# Patient Record
Sex: FEMALE | Race: WHITE | NOT HISPANIC OR LATINO | Employment: OTHER | URBAN - METROPOLITAN AREA
[De-identification: names, ages, dates, MRNs, and addresses within clinical notes are randomized per-mention and may not be internally consistent; named-entity substitution may affect disease eponyms.]

---

## 2018-03-23 PROBLEM — M25.532 WRIST PAIN, LEFT: Status: ACTIVE | Noted: 2018-03-23

## 2021-04-30 PROBLEM — M47.816 LUMBAR SPONDYLOSIS: Status: ACTIVE | Noted: 2021-04-30

## 2023-02-08 PROBLEM — I11.0 HYPERTENSIVE HEART DISEASE WITH HEART FAILURE: Status: ACTIVE | Noted: 2023-02-08

## 2024-01-11 ENCOUNTER — OFFICE VISIT (OUTPATIENT)
Dept: INTERNAL MEDICINE | Facility: CLINIC | Age: 89
End: 2024-01-11
Payer: MEDICARE

## 2024-01-11 VITALS
HEIGHT: 66 IN | SYSTOLIC BLOOD PRESSURE: 100 MMHG | DIASTOLIC BLOOD PRESSURE: 60 MMHG | RESPIRATION RATE: 18 BRPM | BODY MASS INDEX: 24.1 KG/M2 | WEIGHT: 149.94 LBS | HEART RATE: 81 BPM | OXYGEN SATURATION: 95 %

## 2024-01-11 DIAGNOSIS — I11.0 HYPERTENSIVE HEART DISEASE WITH HEART FAILURE: ICD-10-CM

## 2024-01-11 DIAGNOSIS — F33.1 MAJOR DEPRESSIVE DISORDER, RECURRENT EPISODE, MODERATE: ICD-10-CM

## 2024-01-11 DIAGNOSIS — J44.9 CHRONIC OBSTRUCTIVE PULMONARY DISEASE, UNSPECIFIED COPD TYPE: ICD-10-CM

## 2024-01-11 DIAGNOSIS — F02.83 DEMENTIA IN OTHER DISEASES CLASSIFIED ELSEWHERE, UNSPECIFIED SEVERITY, WITH MOOD DISTURBANCE: Primary | ICD-10-CM

## 2024-01-11 DIAGNOSIS — I48.20 CHRONIC ATRIAL FIBRILLATION: ICD-10-CM

## 2024-01-11 DIAGNOSIS — N18.31 CHRONIC KIDNEY DISEASE, STAGE 3A: ICD-10-CM

## 2024-01-11 DIAGNOSIS — I70.0 AORTIC ATHEROSCLEROSIS: ICD-10-CM

## 2024-01-11 DIAGNOSIS — H35.3231 EXUDATIVE AGE-RELATED MACULAR DEGENERATION, BILATERAL, WITH ACTIVE CHOROIDAL NEOVASCULARIZATION: ICD-10-CM

## 2024-01-11 PROCEDURE — 99999 PR PBB SHADOW E&M-EST. PATIENT-LVL IV: CPT | Mod: PBBFAC,,, | Performed by: INTERNAL MEDICINE

## 2024-01-11 PROCEDURE — 99214 OFFICE O/P EST MOD 30 MIN: CPT | Mod: S$GLB,,, | Performed by: INTERNAL MEDICINE

## 2024-01-11 NOTE — PROGRESS NOTES
"HPI:  Caroline Arguello is a 89 y.o. female here for Follow-up  SHE WAS AT [facility] ; REHAB ; MULTIPLE FALLS. NOW BACK AT HOME   Brought by daughter ; she has multiple episodes of syncope with defection and urination.  She is taking gabapentin for back pain   Dr Israel RANGEL  has been monitoring BP via phone.    Review of Systems   Constitutional:  Negative for chills, fatigue, fever and unexpected weight change.   HENT:  Negative for congestion, ear pain, sore throat and trouble swallowing.    Eyes:  Negative for pain and visual disturbance.   Respiratory:  Negative for cough, chest tightness and shortness of breath.    Cardiovascular:  Negative for chest pain, palpitations and leg swelling.   Gastrointestinal:  Negative for abdominal distention, abdominal pain, constipation, diarrhea and vomiting.   Genitourinary:  Negative for difficulty urinating, dysuria, flank pain, frequency and hematuria.   Musculoskeletal:  Positive for back pain. Negative for gait problem, joint swelling, neck pain and neck stiffness.   Skin:  Negative for rash and wound.   Neurological:  Negative for dizziness, seizures, speech difficulty, light-headedness and headaches.    A review of systems was performed and is negative except as noted above.    Objective:  Vitals:    01/11/24 1551   BP: 100/60   Pulse: 81   Resp: 18   SpO2: 95%   Weight: 68 kg (149 lb 14.6 oz)   Height: 5' 6" (1.676 m)      Physical Exam  Vitals and nursing note reviewed.   Constitutional:       Appearance: She is well-developed.   HENT:      Head: Normocephalic and atraumatic.      Right Ear: External ear normal.      Left Ear: External ear normal.   Eyes:      Conjunctiva/sclera: Conjunctivae normal.      Pupils: Pupils are equal, round, and reactive to light.   Neck:      Thyroid: No thyromegaly.      Vascular: No JVD.      Trachea: No tracheal deviation.   Cardiovascular:      Rate and Rhythm: Normal rate and regular rhythm.      Heart sounds: Normal heart sounds. "   Pulmonary:      Effort: Pulmonary effort is normal. No respiratory distress.      Breath sounds: Normal breath sounds. No wheezing or rales.   Chest:      Chest wall: No tenderness.   Abdominal:      General: Bowel sounds are normal. There is no distension.      Palpations: Abdomen is soft. There is no mass.      Tenderness: There is no abdominal tenderness. There is no guarding or rebound.   Musculoskeletal:         General: Normal range of motion.      Cervical back: Normal range of motion and neck supple.   Lymphadenopathy:      Cervical: No cervical adenopathy.   Skin:     General: Skin is warm and dry.   Neurological:      Mental Status: She is alert and oriented to person, place, and time.      Cranial Nerves: No cranial nerve deficit.      Motor: No abnormal muscle tone.      Coordination: Coordination normal.      Deep Tendon Reflexes: Reflexes are normal and symmetric. Reflexes normal.      Comments: CN: Optic discs are flat with normal vasculature, PERRL, Extraoccular movements and visual fields are full. Normal facial sensation and strength, Hearing symmetric, Tongue and Palate are midline and strong. Shoulder Shrug symmetric and strong.        Assessment/Plan:  1. Dementia in other diseases classified elsewhere, unspecified severity, with mood disturbance  Loss of memory   No medications    2. Chronic obstructive pulmonary disease, unspecified COPD type  Nebs as needed     3. Major depressive disorder, recurrent episode, moderate  Continue with zoloft     4. Hypertensive heart disease with heart failure  Continue coreg     5. Chronic atrial fibrillation  Continue xarelto/coreg    6. Exudative age-related macular degeneration, bilateral, with active choroidal neovascularization  Slowly getting worse     7. Chronic kidney disease, stage 3a  BMP  Lab Results   Component Value Date     10/27/2023    K 3.6 10/27/2023     10/27/2023    CO2 24 10/27/2023    BUN 9 10/27/2023    CREATININE 1.0  10/27/2023    CALCIUM 9.8 10/27/2023    ANIONGAP 12 10/27/2023    EGFRNORACEVR 54 (A) 10/27/2023       8. Aortic atherosclerosis  Overview:  Noted on imaging 6/6/2011 CXR  Continue atorvastatin

## 2024-04-24 ENCOUNTER — HOSPITAL ENCOUNTER (EMERGENCY)
Facility: HOSPITAL | Age: 89
Discharge: ANOTHER HOSPITAL | End: 2024-04-24
Attending: SURGERY
Payer: MEDICARE

## 2024-04-24 VITALS
OXYGEN SATURATION: 98 % | WEIGHT: 149.94 LBS | DIASTOLIC BLOOD PRESSURE: 65 MMHG | BODY MASS INDEX: 24.2 KG/M2 | SYSTOLIC BLOOD PRESSURE: 150 MMHG | HEART RATE: 101 BPM | RESPIRATION RATE: 28 BRPM | TEMPERATURE: 98 F

## 2024-04-24 DIAGNOSIS — S72.002A CLOSED FRACTURE OF NECK OF LEFT FEMUR, INITIAL ENCOUNTER: Primary | ICD-10-CM
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
## 2024-04-24 LAB
BASOPHILS # BLD AUTO: 0.02 K/UL (ref 0–0.2)
BASOPHILS NFR BLD: 0.2 % (ref 0–1.9)
DIFFERENTIAL METHOD BLD: ABNORMAL
EOSINOPHIL # BLD AUTO: 0 K/UL (ref 0–0.5)
EOSINOPHIL NFR BLD: 0 % (ref 0–8)
ERYTHROCYTE [DISTWIDTH] IN BLOOD BY AUTOMATED COUNT: 14.4 % (ref 11.5–14.5)
HCT VFR BLD AUTO: 39.4 % (ref 37–48.5)
HGB BLD-MCNC: 13.1 G/DL (ref 12–16)
IMM GRANULOCYTES # BLD AUTO: 0.03 K/UL (ref 0–0.04)
IMM GRANULOCYTES NFR BLD AUTO: 0.3 % (ref 0–0.5)
LYMPHOCYTES # BLD AUTO: 0.9 K/UL (ref 1–4.8)
LYMPHOCYTES NFR BLD: 8.3 % (ref 18–48)
MCH RBC QN AUTO: 30.3 PG (ref 27–31)
MCHC RBC AUTO-ENTMCNC: 33.2 G/DL (ref 32–36)
MCV RBC AUTO: 91 FL (ref 82–98)
MONOCYTES # BLD AUTO: 0.4 K/UL (ref 0.3–1)
MONOCYTES NFR BLD: 4.3 % (ref 4–15)
NEUTROPHILS # BLD AUTO: 9 K/UL (ref 1.8–7.7)
NEUTROPHILS NFR BLD: 86.9 % (ref 38–73)
NRBC BLD-RTO: 0 /100 WBC
PLATELET # BLD AUTO: 224 K/UL (ref 150–450)
PMV BLD AUTO: 9.2 FL (ref 9.2–12.9)
RBC # BLD AUTO: 4.32 M/UL (ref 4–5.4)
WBC # BLD AUTO: 10.3 K/UL (ref 3.9–12.7)

## 2024-04-24 PROCEDURE — 96375 TX/PRO/DX INJ NEW DRUG ADDON: CPT

## 2024-04-24 PROCEDURE — 80053 COMPREHEN METABOLIC PANEL: CPT | Performed by: SURGERY

## 2024-04-24 PROCEDURE — 96374 THER/PROPH/DIAG INJ IV PUSH: CPT

## 2024-04-24 PROCEDURE — 99285 EMERGENCY DEPT VISIT HI MDM: CPT | Mod: 25

## 2024-04-24 PROCEDURE — 63600175 PHARM REV CODE 636 W HCPCS: Mod: JZ,TB | Performed by: SURGERY

## 2024-04-24 PROCEDURE — 25000003 PHARM REV CODE 250: Performed by: SURGERY

## 2024-04-24 PROCEDURE — 85025 COMPLETE CBC W/AUTO DIFF WBC: CPT | Performed by: SURGERY

## 2024-04-24 RX ORDER — ONDANSETRON HYDROCHLORIDE 2 MG/ML
4 INJECTION, SOLUTION INTRAVENOUS
Status: COMPLETED | OUTPATIENT
Start: 2024-04-24 | End: 2024-04-24

## 2024-04-24 RX ORDER — MORPHINE SULFATE 2 MG/ML
1 INJECTION, SOLUTION INTRAMUSCULAR; INTRAVENOUS
Status: COMPLETED | OUTPATIENT
Start: 2024-04-24 | End: 2024-04-24

## 2024-04-24 RX ADMIN — MORPHINE SULFATE 1 MG: 2 INJECTION, SOLUTION INTRAMUSCULAR; INTRAVENOUS at 05:04

## 2024-04-24 RX ADMIN — SODIUM CHLORIDE 1000 ML: 9 INJECTION, SOLUTION INTRAVENOUS at 05:04

## 2024-04-24 RX ADMIN — ONDANSETRON 4 MG: 2 INJECTION INTRAMUSCULAR; INTRAVENOUS at 05:04

## 2024-04-24 NOTE — ED PROVIDER NOTES
Encounter Date: 4/24/2024       History     Chief Complaint   Patient presents with    Fall     PT TO ER VIA [facility] FOLLOWING AN UNWITNESSED GROUND LEVEL FALL. PT HAS A CHRONIC GCS OF 13 DUE TO DEMENTIA. PT REPORTS LEFT LEG AND LEFT HIP PAIN. DENIES BLOOD THINNERS. PT DOESN'T KNOW IF SHE HIT HER HEAD OF LOC.      History of Present Illness  Caroline Arguello is a 89 y.o. female that presents with left hip pain today  Patient with a slip & fall was not witnessed earlier this morning, no LOC  Patient has significant dementia, patient has had several falls in the past  Patient on exam has obvious tenderness to left hip area, no deformity now  Good distal pulses, good capillary refill, neurovascular intact on evaluation    The history is provided by the patient and the EMS personnel.     Review of patient's allergies indicates:   Allergen Reactions    Tramadol Other (See Comments)     Dizziness, disoriented     Past Medical History:   Diagnosis Date    Anticoagulant long-term use     Anxiety     Arthritis     Atrial fibrillation     2011- on xarelto    Back pain     S/p discectomy     Depression     Endometriosis, site unspecified     Hyperlipidemia     Hypertension     Hypertensive heart disease with heart failure 2/8/2023    Insomnia     Osteoporosis     Osteoporosis, unspecified     Urinary tract infection, site not specified      Past Surgical History:   Procedure Laterality Date    ADENOIDECTOMY      BACK SURGERY      1980 discectomy and 1989 scar tissue    BREAST BIOPSY Bilateral     negative    CAUDAL EPIDURAL STEROID INJECTION N/A 8/19/2022    Procedure: CAUDAL EPIDURAL STEROID INJECTION WITH CATHETER;  Surgeon: Radhika Thurman MD;  Location: [facility] OR;  Service: Pain Management;  Laterality: N/A;    CHOLECYSTECTOMY      COLONOSCOPY  2005    COLONOSCOPY N/A 9/28/2017    Procedure: COLONOSCOPY;  Surgeon: Jeffrey Baptiste MD;  Location: [facility];  Service: Endoscopy;  Laterality: N/A;    EYE SURGERY      cataract  bilaterally    HYSTERECTOMY  over 30 yr ago    DUB; total    INJECTION OF ANESTHETIC AGENT AROUND MEDIAL BRANCH NERVES INNERVATING LUMBAR FACET JOINT Bilateral 4/30/2021    Procedure: LUMBAR FACET JOINT BLOCK (L3-4,L4-5,L5-S1);  Surgeon: Radhika Thurman MD;  Location: [facility] OR;  Service: Pain Management;  Laterality: Bilateral;    LEFT HEART CATHETERIZATION Left 10/5/2021    Procedure: Left heart cath;  Surgeon: Damian Wood MD;  Location: [facility] CATH;  Service: Cardiovascular;  Laterality: Left;    OOPHORECTOMY      SPINE SURGERY      TONSILLECTOMY      TRANSFORAMINAL EPIDURAL INJECTION OF STEROID Bilateral 1/8/2021    Procedure: TRANSFORAMINAL EPIDURAL STEROID INJECTION (L5);  Surgeon: Radhika Thurman MD;  Location: [facility] OR;  Service: Pain Management;  Laterality: Bilateral;    TRANSFORAMINAL EPIDURAL INJECTION OF STEROID Bilateral 3/12/2021    Procedure: TRANSFORAMINAL EPIDURAL STEROID INJECTION (L4);  Surgeon: Radhika Thurman MD;  Location: [facility] OR;  Service: Pain Management;  Laterality: Bilateral;     Family History   Problem Relation Name Age of Onset    Coronary artery disease Mother Kelli     Heart disease Mother Kelli         pacemaker    Breast cancer Cousin M &P first     Depression Father Christiano     Heart disease Father Christiano         PAD    Hyperlipidemia Sister 3     Breast cancer Maternal Aunt      Breast cancer Paternal Aunt      Colon cancer Neg Hx      Ovarian cancer Neg Hx       Social History     Tobacco Use    Smoking status: Never    Smokeless tobacco: Never   Substance Use Topics    Alcohol use: Yes     Alcohol/week: 0.8 standard drinks of alcohol     Types: 1 Standard drinks or equivalent per week     Comment: glass of wine ONCE A WEEK    Drug use: No     Review of Systems   Constitutional: Negative.    HENT: Negative.     Eyes: Negative.    Respiratory: Negative.     Cardiovascular: Negative.    Gastrointestinal: Negative.    Genitourinary: Negative.    Musculoskeletal:         (+) left  hip pain   Skin: Negative.    Neurological: Negative.    Psychiatric/Behavioral: Negative.         Physical Exam     Initial Vitals [04/24/24 0234]   BP Pulse Resp Temp SpO2   (!) 160/85 110 20 97.9 °F (36.6 °C) (!) 91 %      MAP       --         Physical Exam    Nursing note and vitals reviewed.  Constitutional: Vital signs are normal. She appears well-developed and well-nourished. She is cooperative.   HENT:   Head: Normocephalic and atraumatic.   Eyes: Conjunctivae, EOM and lids are normal. Pupils are equal, round, and reactive to light.   Neck: Trachea normal and phonation normal. Neck supple. No JVD present.   Normal range of motion.   Full passive range of motion without pain.     Cardiovascular:  Normal rate, regular rhythm, S1 normal, S2 normal, normal heart sounds, intact distal pulses and normal pulses.           Pulmonary/Chest: Effort normal and breath sounds normal.   Abdominal: Abdomen is soft and flat. Bowel sounds are normal.   Musculoskeletal:      Cervical back: Full passive range of motion without pain, normal range of motion and neck supple.      Comments: (+) tenderness of the left hip without any gross deformity     Neurological: She is alert and oriented to person, place, and time. She has normal strength.   Skin: Skin is warm, dry and intact. Capillary refill takes less than 2 seconds.         ED Course   Procedures  Labs Reviewed   COMPREHENSIVE METABOLIC PANEL - Abnormal; Notable for the following components:       Result Value    Potassium 3.2 (*)     Glucose 153 (*)     All other components within normal limits   CBC W/ AUTO DIFFERENTIAL - Abnormal; Notable for the following components:    Gran # (ANC) 9.0 (*)     Lymph # 0.9 (*)     Gran % 86.9 (*)     Lymph % 8.3 (*)     All other components within normal limits          Imaging Results              CT Pelvis Without Contrast (In process)                      X-Ray Tibia Fibula 2 View Left (In process)                      X-Ray Femur  Ap/Lat Left (In process)                      X-Ray Chest 1 View (In process)                      CT Head Without Contrast (In process)                      Medications   sodium chloride 0.9% bolus 1,000 mL 1,000 mL (1,000 mLs Intravenous New Bag 4/24/24 5116)   morphine injection 1 mg (1 mg Intravenous Given 4/24/24 3340)   ondansetron injection 4 mg (4 mg Intravenous Given 4/24/24 4834)     Medical Decision Making  Unwitnessed fall with residual left hip pain afterwards on ER interview  Differential: Hip  sprain, strain, fracture, dislocation, ligament/tendon injury    Problems Addressed:  Closed fracture of neck of left femur, initial encounter: complicated acute illness or injury  Fall: complicated acute illness or injury  Fall (on) (from) other stairs and steps, initial encounter: complicated acute illness or injury    Amount and/or Complexity of Data Reviewed  Labs: ordered. Decision-making details documented in ED Course.  Radiology: ordered and independent interpretation performed.    ED Management & Risks of Complication, Morbidity, & Mortality:  (-) CT head, (-) chest x-ray in the emergency room today  CT pelvis shows displaced left femoral neck fracture this morning  Discussed with [facility] who agrees to accept today    Critical Care ED Physician Time (minutes):  -- Performed by: Stephen Larsen M.D.  -- Date/Time: 7:31 AM 4/24/2024   -- Direct Patient Care (Face Time): 15  -- Additional History from Records or Taking Additional History: 15  -- Ordering, Reviewing, and Interpreting Diagnostic Studies: 15  -- Total Time in Documentation: 15  -- Consultation with Other Physicians: 15  -- Consultation with Family Related to Condition: 15  -- Total Critical Care Time: 75  -- Critical care was necessary to treat Femoral neck fracture/multi-system trauma  -- Critical care was time spent personally by me on the following activities:   -- development of treatment plan with patient & their family  --  examination of patient, ordering and performing treatments   -- review of radiographic studies, re-evaluation of pt's condition  -- review of labs and evaluation of response to treatment     Clinical Impression:  Final diagnoses:  [W19.XXXA] Fall  [W10.8XXA] Fall (on) (from) other stairs and steps, initial encounter  [S72.002A] Closed fracture of neck of left femur, initial encounter (Primary)          ED Disposition Condition    Transfer to Another Facility Stable                Stephen Larsen MD  04/24/24 0780

## 2024-04-25 PROBLEM — J96.01 ACUTE HYPOXIC RESPIRATORY FAILURE: Status: ACTIVE | Noted: 2024-04-25

## 2024-04-25 PROBLEM — J96.01 ACUTE HYPOXIC RESPIRATORY FAILURE: Status: RESOLVED | Noted: 2024-04-25 | Resolved: 2024-04-25

## 2024-04-25 PROBLEM — R06.89 ACUTE RESPIRATORY INSUFFICIENCY: Status: ACTIVE | Noted: 2024-04-25

## 2024-04-26 PROBLEM — J96.01 ACUTE HYPOXEMIC RESPIRATORY FAILURE: Status: ACTIVE | Noted: 2024-04-25
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
## 2024-05-03 ENCOUNTER — LAB VISIT (OUTPATIENT)
Dept: LAB | Facility: HOSPITAL | Age: 89
End: 2024-05-03
Attending: HOSPITALIST
Payer: MEDICARE

## 2024-05-03 DIAGNOSIS — R55 SYNCOPE AND COLLAPSE: Primary | ICD-10-CM

## 2024-05-03 LAB
ANISOCYTOSIS BLD QL SMEAR: SLIGHT
BASOPHILS # BLD AUTO: 0.06 K/UL (ref 0–0.2)
BASOPHILS NFR BLD: 0.9 % (ref 0–1.9)
DACRYOCYTES BLD QL SMEAR: ABNORMAL
DIFFERENTIAL METHOD BLD: ABNORMAL
EOSINOPHIL # BLD AUTO: 0.4 K/UL (ref 0–0.5)
EOSINOPHIL NFR BLD: 5.6 % (ref 0–8)
ERYTHROCYTE [DISTWIDTH] IN BLOOD BY AUTOMATED COUNT: 14.7 % (ref 11.5–14.5)
GIANT PLATELETS BLD QL SMEAR: PRESENT
HCT VFR BLD AUTO: 35.1 % (ref 37–48.5)
HGB BLD-MCNC: 11.6 G/DL (ref 12–16)
HYPOCHROMIA BLD QL SMEAR: ABNORMAL
IMM GRANULOCYTES # BLD AUTO: 0.03 K/UL (ref 0–0.04)
IMM GRANULOCYTES NFR BLD AUTO: 0.5 % (ref 0–0.5)
LYMPHOCYTES # BLD AUTO: 1.7 K/UL (ref 1–4.8)
LYMPHOCYTES NFR BLD: 25.5 % (ref 18–48)
MCH RBC QN AUTO: 30.2 PG (ref 27–31)
MCHC RBC AUTO-ENTMCNC: 33 G/DL (ref 32–36)
MCV RBC AUTO: 91 FL (ref 82–98)
MONOCYTES # BLD AUTO: 0.4 K/UL (ref 0.3–1)
MONOCYTES NFR BLD: 6.4 % (ref 4–15)
NEUTROPHILS # BLD AUTO: 4 K/UL (ref 1.8–7.7)
NEUTROPHILS NFR BLD: 61.1 % (ref 38–73)
NRBC BLD-RTO: 0 /100 WBC
OVALOCYTES BLD QL SMEAR: ABNORMAL
PLATELET # BLD AUTO: 357 K/UL (ref 150–450)
PLATELET BLD QL SMEAR: ABNORMAL
PMV BLD AUTO: 9.1 FL (ref 9.2–12.9)
POIKILOCYTOSIS BLD QL SMEAR: SLIGHT
POLYCHROMASIA BLD QL SMEAR: ABNORMAL
RBC # BLD AUTO: 3.84 M/UL (ref 4–5.4)
WBC # BLD AUTO: 6.55 K/UL (ref 3.9–12.7)

## 2024-05-03 PROCEDURE — 85025 COMPLETE CBC W/AUTO DIFF WBC: CPT | Performed by: HOSPITALIST

## 2024-05-03 PROCEDURE — 36415 COLL VENOUS BLD VENIPUNCTURE: CPT | Performed by: HOSPITALIST

## 2024-05-03 PROCEDURE — 80053 COMPREHEN METABOLIC PANEL: CPT | Performed by: HOSPITALIST

## 2024-05-24 ENCOUNTER — HOSPITAL ENCOUNTER (INPATIENT)
Facility: HOSPITAL | Age: 89
LOS: 2 days | Discharge: HOME | DRG: 312 | End: 2024-05-27
Attending: STUDENT IN AN ORGANIZED HEALTH CARE EDUCATION/TRAINING PROGRAM | Admitting: STUDENT IN AN ORGANIZED HEALTH CARE EDUCATION/TRAINING PROGRAM
Payer: MEDICARE

## 2024-05-24 DIAGNOSIS — R55 NEAR SYNCOPE: ICD-10-CM

## 2024-05-24 DIAGNOSIS — E86.0 DEHYDRATION: Primary | ICD-10-CM

## 2024-05-24 DIAGNOSIS — R53.1 GENERALIZED WEAKNESS: ICD-10-CM

## 2024-05-24 PROBLEM — N30.00 ACUTE CYSTITIS WITHOUT HEMATURIA: Status: ACTIVE | Noted: 2024-05-24

## 2024-05-24 LAB
ALBUMIN SERPL BCP-MCNC: 3.1 G/DL (ref 3.5–5.2)
ALBUMIN SERPL BCP-MCNC: 3.2 G/DL (ref 3.5–5.2)
ALBUMIN SERPL BCP-MCNC: 3.7 G/DL (ref 3.5–5.2)
ALP SERPL-CCNC: 76 U/L (ref 55–135)
ALP SERPL-CCNC: 80 U/L (ref 55–135)
ALP SERPL-CCNC: 83 U/L (ref 55–135)
ALT SERPL W/O P-5'-P-CCNC: 10 U/L (ref 10–44)
ALT SERPL W/O P-5'-P-CCNC: 11 U/L (ref 10–44)
ALT SERPL W/O P-5'-P-CCNC: 17 U/L (ref 10–44)
ANION GAP SERPL CALC-SCNC: 11 MMOL/L (ref 8–16)
ANION GAP SERPL CALC-SCNC: 11 MMOL/L (ref 8–16)
ANION GAP SERPL CALC-SCNC: 12 MMOL/L (ref 8–16)
AST SERPL-CCNC: 18 U/L (ref 10–40)
AST SERPL-CCNC: 22 U/L (ref 10–40)
AST SERPL-CCNC: 22 U/L (ref 10–40)
BACTERIA #/AREA URNS HPF: ABNORMAL /HPF
BASOPHILS # BLD AUTO: 0.08 K/UL (ref 0–0.2)
BASOPHILS NFR BLD: 0.7 % (ref 0–1.9)
BILIRUB SERPL-MCNC: 0.4 MG/DL (ref 0.1–1)
BILIRUB SERPL-MCNC: 0.9 MG/DL (ref 0.1–1)
BILIRUB SERPL-MCNC: 1 MG/DL (ref 0.1–1)
BILIRUB UR QL STRIP: ABNORMAL
BUN SERPL-MCNC: 10 MG/DL (ref 10–30)
BUN SERPL-MCNC: 13 MG/DL (ref 10–30)
BUN SERPL-MCNC: 9 MG/DL (ref 10–30)
CALCIUM SERPL-MCNC: 9 MG/DL (ref 8.7–10.5)
CALCIUM SERPL-MCNC: 9.2 MG/DL (ref 8.7–10.5)
CALCIUM SERPL-MCNC: 9.5 MG/DL (ref 8.7–10.5)
CHLORIDE SERPL-SCNC: 102 MMOL/L (ref 95–110)
CHLORIDE SERPL-SCNC: 104 MMOL/L (ref 95–110)
CHLORIDE SERPL-SCNC: 107 MMOL/L (ref 95–110)
CLARITY UR: ABNORMAL
CO2 SERPL-SCNC: 23 MMOL/L (ref 23–29)
CO2 SERPL-SCNC: 24 MMOL/L (ref 23–29)
CO2 SERPL-SCNC: 26 MMOL/L (ref 23–29)
COLOR UR: YELLOW
CREAT SERPL-MCNC: 0.7 MG/DL (ref 0.5–1.4)
CREAT SERPL-MCNC: 0.8 MG/DL (ref 0.5–1.4)
CREAT SERPL-MCNC: 1.1 MG/DL (ref 0.5–1.4)
DIFFERENTIAL METHOD BLD: ABNORMAL
EOSINOPHIL # BLD AUTO: 0.1 K/UL (ref 0–0.5)
EOSINOPHIL NFR BLD: 1.2 % (ref 0–8)
ERYTHROCYTE [DISTWIDTH] IN BLOOD BY AUTOMATED COUNT: 14.8 % (ref 11.5–14.5)
EST. GFR  (NO RACE VARIABLE): 47 ML/MIN/1.73 M^2
EST. GFR  (NO RACE VARIABLE): >60 ML/MIN/1.73 M^2
EST. GFR  (NO RACE VARIABLE): >60 ML/MIN/1.73 M^2
GLUCOSE SERPL-MCNC: 124 MG/DL (ref 70–110)
GLUCOSE SERPL-MCNC: 153 MG/DL (ref 70–110)
GLUCOSE SERPL-MCNC: 98 MG/DL (ref 70–110)
GLUCOSE UR QL STRIP: NEGATIVE
HCT VFR BLD AUTO: 37 % (ref 37–48.5)
HGB BLD-MCNC: 11.9 G/DL (ref 12–16)
HGB UR QL STRIP: ABNORMAL
HYALINE CASTS #/AREA URNS LPF: 0 /LPF
IMM GRANULOCYTES # BLD AUTO: 0.05 K/UL (ref 0–0.04)
IMM GRANULOCYTES NFR BLD AUTO: 0.4 % (ref 0–0.5)
KETONES UR QL STRIP: ABNORMAL
LACTATE SERPL-SCNC: 2 MMOL/L (ref 0.5–2.2)
LEUKOCYTE ESTERASE UR QL STRIP: ABNORMAL
LYMPHOCYTES # BLD AUTO: 2.2 K/UL (ref 1–4.8)
LYMPHOCYTES NFR BLD: 19.2 % (ref 18–48)
MCH RBC QN AUTO: 29.8 PG (ref 27–31)
MCHC RBC AUTO-ENTMCNC: 32.2 G/DL (ref 32–36)
MCV RBC AUTO: 93 FL (ref 82–98)
MICROSCOPIC COMMENT: ABNORMAL
MONOCYTES # BLD AUTO: 0.8 K/UL (ref 0.3–1)
MONOCYTES NFR BLD: 6.9 % (ref 4–15)
NEUTROPHILS # BLD AUTO: 8.1 K/UL (ref 1.8–7.7)
NEUTROPHILS NFR BLD: 71.6 % (ref 38–73)
NITRITE UR QL STRIP: NEGATIVE
NRBC BLD-RTO: 0 /100 WBC
OHS QRS DURATION: 70 MS
OHS QTC CALCULATION: 465 MS
PH UR STRIP: 8 [PH] (ref 5–8)
PLATELET # BLD AUTO: 277 K/UL (ref 150–450)
PMV BLD AUTO: 9.5 FL (ref 9.2–12.9)
POTASSIUM SERPL-SCNC: 3.2 MMOL/L (ref 3.5–5.1)
POTASSIUM SERPL-SCNC: 3.2 MMOL/L (ref 3.5–5.1)
POTASSIUM SERPL-SCNC: 4.4 MMOL/L (ref 3.5–5.1)
PROT SERPL-MCNC: 6.5 G/DL (ref 6–8.4)
PROT SERPL-MCNC: 6.6 G/DL (ref 6–8.4)
PROT SERPL-MCNC: 7.4 G/DL (ref 6–8.4)
PROT UR QL STRIP: ABNORMAL
RBC # BLD AUTO: 4 M/UL (ref 4–5.4)
RBC #/AREA URNS HPF: 4 /HPF (ref 0–4)
SODIUM SERPL-SCNC: 137 MMOL/L (ref 136–145)
SODIUM SERPL-SCNC: 141 MMOL/L (ref 136–145)
SODIUM SERPL-SCNC: 142 MMOL/L (ref 136–145)
SP GR UR STRIP: 1.01 (ref 1–1.03)
TROPONIN I SERPL DL<=0.01 NG/ML-MCNC: 0.01 NG/ML (ref 0–0.03)
TSH SERPL DL<=0.005 MIU/L-ACNC: 1.62 UIU/ML (ref 0.4–4)
URN SPEC COLLECT METH UR: ABNORMAL
UROBILINOGEN UR STRIP-ACNC: NEGATIVE EU/DL
WBC # BLD AUTO: 11.35 K/UL (ref 3.9–12.7)
WBC #/AREA URNS HPF: >100 /HPF (ref 0–5)

## 2024-05-24 PROCEDURE — 81000 URINALYSIS NONAUTO W/SCOPE: CPT | Performed by: STUDENT IN AN ORGANIZED HEALTH CARE EDUCATION/TRAINING PROGRAM

## 2024-05-24 PROCEDURE — 96365 THER/PROPH/DIAG IV INF INIT: CPT

## 2024-05-24 PROCEDURE — 96361 HYDRATE IV INFUSION ADD-ON: CPT

## 2024-05-24 PROCEDURE — 87086 URINE CULTURE/COLONY COUNT: CPT | Performed by: STUDENT IN AN ORGANIZED HEALTH CARE EDUCATION/TRAINING PROGRAM

## 2024-05-24 PROCEDURE — 87077 CULTURE AEROBIC IDENTIFY: CPT | Performed by: STUDENT IN AN ORGANIZED HEALTH CARE EDUCATION/TRAINING PROGRAM

## 2024-05-24 PROCEDURE — G0378 HOSPITAL OBSERVATION PER HR: HCPCS

## 2024-05-24 PROCEDURE — 83605 ASSAY OF LACTIC ACID: CPT | Performed by: STUDENT IN AN ORGANIZED HEALTH CARE EDUCATION/TRAINING PROGRAM

## 2024-05-24 PROCEDURE — 63600175 PHARM REV CODE 636 W HCPCS: Performed by: STUDENT IN AN ORGANIZED HEALTH CARE EDUCATION/TRAINING PROGRAM

## 2024-05-24 PROCEDURE — 84484 ASSAY OF TROPONIN QUANT: CPT | Performed by: STUDENT IN AN ORGANIZED HEALTH CARE EDUCATION/TRAINING PROGRAM

## 2024-05-24 PROCEDURE — 99285 EMERGENCY DEPT VISIT HI MDM: CPT | Mod: 25

## 2024-05-24 PROCEDURE — 84443 ASSAY THYROID STIM HORMONE: CPT | Performed by: STUDENT IN AN ORGANIZED HEALTH CARE EDUCATION/TRAINING PROGRAM

## 2024-05-24 PROCEDURE — 99222 1ST HOSP IP/OBS MODERATE 55: CPT | Mod: ,,, | Performed by: STUDENT IN AN ORGANIZED HEALTH CARE EDUCATION/TRAINING PROGRAM

## 2024-05-24 PROCEDURE — 25000003 PHARM REV CODE 250: Performed by: STUDENT IN AN ORGANIZED HEALTH CARE EDUCATION/TRAINING PROGRAM

## 2024-05-24 PROCEDURE — 87186 SC STD MICRODIL/AGAR DIL: CPT | Mod: 59 | Performed by: STUDENT IN AN ORGANIZED HEALTH CARE EDUCATION/TRAINING PROGRAM

## 2024-05-24 PROCEDURE — 94761 N-INVAS EAR/PLS OXIMETRY MLT: CPT

## 2024-05-24 PROCEDURE — 87088 URINE BACTERIA CULTURE: CPT | Performed by: STUDENT IN AN ORGANIZED HEALTH CARE EDUCATION/TRAINING PROGRAM

## 2024-05-24 PROCEDURE — 87040 BLOOD CULTURE FOR BACTERIA: CPT | Mod: 59 | Performed by: STUDENT IN AN ORGANIZED HEALTH CARE EDUCATION/TRAINING PROGRAM

## 2024-05-24 PROCEDURE — 85025 COMPLETE CBC W/AUTO DIFF WBC: CPT | Performed by: STUDENT IN AN ORGANIZED HEALTH CARE EDUCATION/TRAINING PROGRAM

## 2024-05-24 PROCEDURE — 80053 COMPREHEN METABOLIC PANEL: CPT | Performed by: STUDENT IN AN ORGANIZED HEALTH CARE EDUCATION/TRAINING PROGRAM

## 2024-05-24 PROCEDURE — 93005 ELECTROCARDIOGRAM TRACING: CPT

## 2024-05-24 PROCEDURE — 93010 ELECTROCARDIOGRAM REPORT: CPT | Mod: ,,, | Performed by: INTERNAL MEDICINE

## 2024-05-24 RX ORDER — IPRATROPIUM BROMIDE AND ALBUTEROL SULFATE 2.5; .5 MG/3ML; MG/3ML
3 SOLUTION RESPIRATORY (INHALATION) EVERY 4 HOURS PRN
Status: DISCONTINUED | OUTPATIENT
Start: 2024-05-24 | End: 2024-05-27 | Stop reason: HOSPADM

## 2024-05-24 RX ORDER — CARVEDILOL 12.5 MG/1
12.5 TABLET ORAL NIGHTLY
Status: DISCONTINUED | OUTPATIENT
Start: 2024-05-24 | End: 2024-05-25

## 2024-05-24 RX ORDER — VIT C/E/ZN/COPPR/LUTEIN/ZEAXAN 250 MG-2MG
1 CAPSULE ORAL NIGHTLY
COMMUNITY

## 2024-05-24 RX ORDER — SERTRALINE HYDROCHLORIDE 50 MG/1
50 TABLET, FILM COATED ORAL DAILY
Status: DISCONTINUED | OUTPATIENT
Start: 2024-05-25 | End: 2024-05-27 | Stop reason: HOSPADM

## 2024-05-24 RX ORDER — SODIUM CHLORIDE 0.9 % (FLUSH) 0.9 %
10 SYRINGE (ML) INJECTION
Status: DISCONTINUED | OUTPATIENT
Start: 2024-05-24 | End: 2024-05-27 | Stop reason: HOSPADM

## 2024-05-24 RX ORDER — CEFTRIAXONE 1 G/1
1 INJECTION, POWDER, FOR SOLUTION INTRAMUSCULAR; INTRAVENOUS
Status: DISCONTINUED | OUTPATIENT
Start: 2024-05-25 | End: 2024-05-24

## 2024-05-24 RX ORDER — ATORVASTATIN CALCIUM 10 MG/1
10 TABLET, FILM COATED ORAL NIGHTLY
Status: DISCONTINUED | OUTPATIENT
Start: 2024-05-24 | End: 2024-05-27 | Stop reason: HOSPADM

## 2024-05-24 RX ORDER — GUAIFENESIN 600 MG/1
1200 TABLET, EXTENDED RELEASE ORAL 2 TIMES DAILY PRN
Status: DISCONTINUED | OUTPATIENT
Start: 2024-05-24 | End: 2024-05-27 | Stop reason: HOSPADM

## 2024-05-24 RX ORDER — GUAIFENESIN 600 MG/1
1200 TABLET, EXTENDED RELEASE ORAL 2 TIMES DAILY PRN
COMMUNITY

## 2024-05-24 RX ORDER — TALC
6 POWDER (GRAM) TOPICAL NIGHTLY PRN
Status: DISCONTINUED | OUTPATIENT
Start: 2024-05-24 | End: 2024-05-27 | Stop reason: HOSPADM

## 2024-05-24 RX ORDER — GABAPENTIN 600 MG/1
600 TABLET ORAL NIGHTLY
Status: DISCONTINUED | OUTPATIENT
Start: 2024-05-25 | End: 2024-05-27 | Stop reason: HOSPADM

## 2024-05-24 RX ADMIN — CARVEDILOL 12.5 MG: 12.5 TABLET, FILM COATED ORAL at 08:05

## 2024-05-24 RX ADMIN — SODIUM CHLORIDE 500 ML: 9 INJECTION, SOLUTION INTRAVENOUS at 12:05

## 2024-05-24 RX ADMIN — CEFTRIAXONE SODIUM 1 G: 1 INJECTION, POWDER, FOR SOLUTION INTRAMUSCULAR; INTRAVENOUS at 01:05

## 2024-05-24 RX ADMIN — ATORVASTATIN CALCIUM 10 MG: 10 TABLET, FILM COATED ORAL at 08:05

## 2024-05-24 RX ADMIN — SODIUM CHLORIDE 500 ML: 9 INJECTION, SOLUTION INTRAVENOUS at 10:05

## 2024-05-24 NOTE — ED PROVIDER NOTES
Encounter Date: 5/24/2024       History     Chief Complaint   Patient presents with    Loss of Consciousness     EMS reports called for syncopal episode while sitting in her chair. Did not fall or hit her head. Pt's BP was low systolic 80's on EMS arrival. NS 100ml given PTA.     89-year-old female with history of AFib, hypertension, dementia, presenting after a syncopal episode at home.  Patient has baseline confusion due to her dementia.  She states that she was sitting there, became lightheaded and fell forward while sitting.  Patient did not fall to the ground.  She immediately regained consciousness.  Denies any chest pain or shortness breath associated with this.  No abdominal pain, nausea, vomiting, diarrhea.  Patient reports she feels at her baseline.  EMS noted a blood pressure of systolic 80, gave 500 mL of fluid prior to arrival.      Review of patient's allergies indicates:   Allergen Reactions    Tramadol Other (See Comments)     Dizziness, disoriented     Past Medical History:   Diagnosis Date    Acute hypoxic respiratory failure 4/25/2024    Anticoagulant long-term use     Anxiety     Arthritis     Atrial fibrillation     2011- on xarelto    Back pain     S/p discectomy     Depression     Endometriosis, site unspecified     Hyperlipidemia     Hypertension     Hypertensive heart disease with heart failure 2/8/2023    Insomnia     Osteoporosis     Osteoporosis, unspecified     Urinary tract infection, site not specified      Past Surgical History:   Procedure Laterality Date    ADENOIDECTOMY      BACK SURGERY      1980 discectomy and 1989 scar tissue    BREAST BIOPSY Bilateral     negative    CAUDAL EPIDURAL STEROID INJECTION N/A 8/19/2022    Procedure: CAUDAL EPIDURAL STEROID INJECTION WITH CATHETER;  Surgeon: Radhika Thurman MD;  Location: [facility] OR;  Service: Pain Management;  Laterality: N/A;    CHOLECYSTECTOMY      COLONOSCOPY  2005    COLONOSCOPY N/A 9/28/2017    Procedure: COLONOSCOPY;  Surgeon:  Jeffrey Baptiste MD;  Location: [facility];  Service: Endoscopy;  Laterality: N/A;    EYE SURGERY      cataract bilaterally    HEMIARTHROPLASTY OF HIP Left 4/25/2024    Procedure: HEMIARTHROPLASTY, HIP;  Surgeon: Zac Alcantara MD;  Location: [facility];  Service: Orthopedics;  Laterality: Left;    HYSTERECTOMY  over 30 yr ago    DUB; total    INJECTION OF ANESTHETIC AGENT AROUND MEDIAL BRANCH NERVES INNERVATING LUMBAR FACET JOINT Bilateral 4/30/2021    Procedure: LUMBAR FACET JOINT BLOCK (L3-4,L4-5,L5-S1);  Surgeon: Radhika Thurman MD;  Location: [facility];  Service: Pain Management;  Laterality: Bilateral;    LEFT HEART CATHETERIZATION Left 10/5/2021    Procedure: Left heart cath;  Surgeon: Damian Wood MD;  [facility];  Service: Cardiovascular;  Laterality: Left;    OOPHORECTOMY      SPINE SURGERY      TONSILLECTOMY      TRANSFORAMINAL EPIDURAL INJECTION OF STEROID Bilateral 1/8/2021    Procedure: TRANSFORAMINAL EPIDURAL STEROID INJECTION (L5);  Surgeon: Radhika Thurman MD;  Location: [facility];  Service: Pain Management;  Laterality: Bilateral;    TRANSFORAMINAL EPIDURAL INJECTION OF STEROID Bilateral 3/12/2021    Procedure: TRANSFORAMINAL EPIDURAL STEROID INJECTION (L4);  Surgeon: Radhika Thurman MD;  Location: [facility];  Service: Pain Management;  Laterality: Bilateral;     Family History   Problem Relation Name Age of Onset    Coronary artery disease Mother Kelli     Heart disease Mother Kelli         pacemaker    Breast cancer Cousin M &P first     Depression Father Christiano     Heart disease Father Christiano         PAD    Hyperlipidemia Sister 3     Breast cancer Maternal Aunt      Breast cancer Paternal Aunt      Colon cancer Neg Hx      Ovarian cancer Neg Hx       Social History     Tobacco Use    Smoking status: Never    Smokeless tobacco: Never   Substance Use Topics    Alcohol use: Yes     Alcohol/week: 0.8 standard drinks of alcohol     Types: 1 Standard drinks or equivalent per week     Comment:  glass of wine ONCE A WEEK    Drug use: No     Review of Systems   Constitutional:  Negative for fever.   HENT:  Negative for sore throat.    Respiratory:  Negative for shortness of breath.    Cardiovascular:  Negative for chest pain.   Gastrointestinal:  Negative for nausea.   Genitourinary:  Negative for dysuria.   Musculoskeletal:  Negative for back pain.   Skin:  Negative for rash.   Neurological:  Positive for syncope and light-headedness. Negative for weakness.   Hematological:  Does not bruise/bleed easily.       Physical Exam     Initial Vitals   BP Pulse Resp Temp SpO2   05/24/24 1013 05/24/24 1015 05/24/24 1013 05/24/24 1013 05/24/24 1015   120/81 (!) 112 17 98.5 °F (36.9 °C) 100 %      MAP       --                Physical Exam    Nursing note and vitals reviewed.  Constitutional: She appears well-developed.   HENT:   Head: Normocephalic.   Eyes: Pupils are equal, round, and reactive to light.   Neck:   Normal range of motion.  Cardiovascular:            No murmur heard.  Pulmonary/Chest: No respiratory distress.   Clear lungs bilaterally.  No respiratory distress.  No wheezing or rales.  Good air movement.     Abdominal: Abdomen is soft. She exhibits no distension. There is no abdominal tenderness. There is no rebound.   Musculoskeletal:         General: No edema.      Cervical back: Normal range of motion.     Neurological: She is alert.   Alert and oriented to person place and time. No facial droop. CN 2-12 intact. Fluent speech with expression and comprehension. Strength 5/5 and sensation intact in upper and lower extremities.    Skin: Skin is warm.   Psychiatric: She has a normal mood and affect.         ED Course   Procedures  Labs Reviewed   CBC W/ AUTO DIFFERENTIAL   COMPREHENSIVE METABOLIC PANEL   TROPONIN I   TSH   URINALYSIS, REFLEX TO URINE CULTURE     EKG Readings: (Independently Interpreted)   Initial Reading: No STEMI. Rhythm: Atrial Fibrillation. Heart Rate: 118. Ectopy: No Ectopy.  Conduction: Normal.       Imaging Results    None          Medications - No data to display  Medical Decision Making  DDX: Syncope - most likely due to dehydration.  Also concern for cardiogenic syncope given history, risk factors. Less likely neurological given nonfocal neuro exam, history, no urinary incontinence, no tongue biting, no seizure like activity. R/o hyper/hypoglycemia, occult infection.  Patient has no concerning morphologies on their EKG for any concerning underlying cardiac pathology (including ACS, Brugada, Wellens, Prolonged QT, HOCM, WPW, ARVD)     DX: BMP, CBC, trop, EKG. UA. CXR.   TX: Analgesia PRN. IVF if poor PO intake.   DISPO:  Pending workup        Amount and/or Complexity of Data Reviewed  Labs: ordered.  Radiology: ordered.                                      Clinical Impression:  Final diagnoses:  [R53.1] Generalized weakness                 Flo Adams MD  05/24/24 1020       Flo Adams MD  05/24/24 1045

## 2024-05-24 NOTE — H&P
[facility] ([address] [place] [address] Medicine  History & Physical    Patient Name: Caroline Arguello  MRN: [id number]  Patient Class: OP- Observation  Admission Date: 5/24/2024  Attending Physician: Miki Vasques MD   Primary Care Provider: Ev Guevara MD         Patient information was obtained from ER records.     Subjective:     Principal Problem:Near syncope    Chief Complaint:   Chief Complaint   Patient presents with    Loss of Consciousness     EMS reports called for syncopal episode while sitting in her chair. Did not fall or hit her head. Pt's BP was low systolic 80's on EMS arrival. NS 100ml given PTA.        HPI: 89 yo F with PMHx of chronic AF on Xarelto, HTN, HLD, depression, dementia, and frequent falls who presented to ED following a syncopal episode at home. Patient is only oriented to self at baseline and requires assistance with all ADLs due to dementia per chart review. On my eval, pt is alone in room. Per ER notes: daughter states that she was sitting there, became lightheaded and fell forward while sitting. Patient did not fall to the ground. She immediately regained consciousness. Denies any chest pain or shortness breath associated with this. No abdominal pain, nausea, vomiting, diarrhea. Patient reports she feels at her baseline. EMS noted a blood pressure of systolic 80, gave 500 mL of fluid prior to arrival. She was found to have UTI in ER.    Past Medical History:   Diagnosis Date    Acute hypoxic respiratory failure 4/25/2024    Anticoagulant long-term use     Anxiety     Arthritis     Atrial fibrillation     2011- on xarelto    Back pain     S/p discectomy     Depression     Endometriosis, site unspecified     Hyperlipidemia     Hypertension     Hypertensive heart disease with heart failure 2/8/2023    Insomnia     Osteoporosis     Osteoporosis, unspecified     Urinary tract infection, site not specified        Past Surgical History:   Procedure Laterality Date     ADENOIDECTOMY      BACK SURGERY      1980 discectomy and 1989 scar tissue    BREAST BIOPSY Bilateral     negative    CAUDAL EPIDURAL STEROID INJECTION N/A 8/19/2022    Procedure: CAUDAL EPIDURAL STEROID INJECTION WITH CATHETER;  Surgeon: Radhika Thurman MD;  Location: [facility] OR;  Service: Pain Management;  Laterality: N/A;    CHOLECYSTECTOMY      COLONOSCOPY  2005    COLONOSCOPY N/A 9/28/2017    Procedure: COLONOSCOPY;  Surgeon: Jeffrey Baptiste MD;  Location: [facility];  Service: Endoscopy;  Laterality: N/A;    EYE SURGERY      cataract bilaterally    HEMIARTHROPLASTY OF HIP Left 4/25/2024    Procedure: HEMIARTHROPLASTY, HIP;  Surgeon: Zac Alcantara MD;  Location: [facility] OR;  Service: Orthopedics;  Laterality: Left;    HYSTERECTOMY  over 30 yr ago    DUB; total    INJECTION OF ANESTHETIC AGENT AROUND MEDIAL BRANCH NERVES INNERVATING LUMBAR FACET JOINT Bilateral 4/30/2021    Procedure: LUMBAR FACET JOINT BLOCK (L3-4,L4-5,L5-S1);  Surgeon: Radhika Thurman MD;  Location: [facility] OR;  Service: Pain Management;  Laterality: Bilateral;    LEFT HEART CATHETERIZATION Left 10/5/2021    Procedure: Left heart cath;  Surgeon: Damian Wood MD;  Location: [facility] CATH;  Service: Cardiovascular;  Laterality: Left;    OOPHORECTOMY      SPINE SURGERY      TONSILLECTOMY      TRANSFORAMINAL EPIDURAL INJECTION OF STEROID Bilateral 1/8/2021    Procedure: TRANSFORAMINAL EPIDURAL STEROID INJECTION (L5);  Surgeon: Radhika Thurman MD;  Location: [facility] OR;  Service: Pain Management;  Laterality: Bilateral;    TRANSFORAMINAL EPIDURAL INJECTION OF STEROID Bilateral 3/12/2021    Procedure: TRANSFORAMINAL EPIDURAL STEROID INJECTION (L4);  Surgeon: Radhika Thurman MD;  Location: [facility];  Service: Pain Management;  Laterality: Bilateral;       Review of patient's allergies indicates:   Allergen Reactions    Tramadol Other (See Comments)     Dizziness, disoriented       No current facility-administered medications on file prior to encounter.     Current  Outpatient Medications on File Prior to Encounter   Medication Sig    acetaminophen (TYLENOL) 500 MG tablet Take 2 tablets by mouth 3 (three) times daily as needed.    albuterol-ipratropium (DUO-NEB) 2.5 mg-0.5 mg/3 mL nebulizer solution Take 1 vial by nebulization every 6 hours as needed for shortness of breath    atorvastatin (LIPITOR) 10 MG tablet Take 1 tablet by mouth every evening.    carvediloL (COREG) 25 MG tablet Take 1 tablet (25 mg total) by mouth Daily. (Patient taking differently: Take 25 mg by mouth every evening.)    gabapentin (NEURONTIN) 600 MG tablet Take 1 tablet (600 mg total) by mouth every evening. (Patient taking differently: Take 600 mg by mouth every morning.)    guaiFENesin (MUCINEX) 600 mg 12 hr tablet Take 1,200 mg by mouth 2 (two) times daily as needed for Congestion.    rivaroxaban (XARELTO) 15 mg Tab Take 1 tablet (15 mg total) by mouth once daily. (Patient taking differently: Take 15 mg by mouth every evening.)    sertraline (ZOLOFT) 50 MG tablet Take 1 tablet by mouth every day    vit C,N-Fw-zvzmx-lutein-zeaxan (EYE MULTIVIT, LUTEIN-ZEAXAN,) 153-57-21-2-5 mg Cap Take 1 tablet by mouth every evening.    [DISCONTINUED] multivitamin (THERAGRAN) per tablet Take 1 tablet by mouth once daily.    [DISCONTINUED] vit A/vit C/vit E/zinc/copper (PRESERVISION AREDS ORAL) Take by mouth.     Family History       Problem Relation (Age of Onset)    Breast cancer Cousin, Maternal Aunt, Paternal Aunt    Coronary artery disease Mother    Depression Father    Heart disease Mother, Father    Hyperlipidemia Sister          Tobacco Use    Smoking status: Never    Smokeless tobacco: Never   Substance and Sexual Activity    Alcohol use: Yes     Alcohol/week: 0.8 standard drinks of alcohol     Types: 1 Standard drinks or equivalent per week     Comment: glass of wine ONCE A WEEK    Drug use: No    Sexual activity: Not Currently     Birth control/protection: Surgical, Post-menopausal, See Surgical Hx      "Comment:      Review of Systems   Unable to perform ROS: Dementia     Objective:     Vital Signs (Most Recent):  Temp: 98.6 °F (37 °C) (05/24/24 1421)  Pulse: 108 (05/24/24 1421)  Resp: 19 (05/24/24 1421)  BP: 114/62 (05/24/24 1421)  SpO2: 95 % (05/24/24 1426) Vital Signs (24h Range):  Temp:  [98.2 °F (36.8 °C)-99.2 °F (37.3 °C)] 98.6 °F (37 °C)  Pulse:  [] 108  Resp:  [17-19] 19  SpO2:  [90 %-100 %] 95 %  BP: (114-135)/(62-85) 114/62     Weight: 67.1 kg (147 lb 14.9 oz)  Body mass index is 23.88 kg/m².     Physical Exam  Vitals and nursing note reviewed.   Constitutional:       General: She is not in acute distress.     Comments: Sleeping, but easily arousable   HENT:      Head: Normocephalic and atraumatic.      Mouth/Throat:      Mouth: Mucous membranes are moist.   Eyes:      Extraocular Movements: Extraocular movements intact.      Conjunctiva/sclera: Conjunctivae normal.      Pupils: Pupils are equal, round, and reactive to light.   Cardiovascular:      Rate and Rhythm: Normal rate and regular rhythm.      Heart sounds: Normal heart sounds. No murmur heard.  Pulmonary:      Effort: Pulmonary effort is normal. No respiratory distress.      Breath sounds: Normal breath sounds.   Abdominal:      General: Bowel sounds are normal. There is no distension.      Palpations: Abdomen is soft.      Tenderness: There is no abdominal tenderness.   Musculoskeletal:      Cervical back: Neck supple.      Right lower leg: No edema.      Left lower leg: No edema.   Skin:     General: Skin is warm and dry.   Neurological:      General: No focal deficit present.      Mental Status: Mental status is at baseline. She is disoriented.              CRANIAL NERVES     CN III, IV, VI   Pupils are equal, round, and reactive to light.       Significant Labs: All pertinent labs within the past 24 hours have been reviewed.  Blood Culture: No results for input(s): "LABBLOO" in the last 48 hours.  BMP:   Recent Labs   Lab " "05/24/24  1035   *      K 4.4      CO2 23   BUN 13   CREATININE 1.1   CALCIUM 9.0     CBC:   Recent Labs   Lab 05/24/24  1035   WBC 11.35   HGB 11.9*   HCT 37.0        CMP:   Recent Labs   Lab 05/24/24  1035      K 4.4      CO2 23   *   BUN 13   CREATININE 1.1   CALCIUM 9.0   PROT 6.6   ALBUMIN 3.2*   BILITOT 0.9   ALKPHOS 80   AST 22   ALT 10   ANIONGAP 12     Cardiac Markers: No results for input(s): "CKMB", "MYOGLOBIN", "BNP", "TROPISTAT" in the last 48 hours.  Coagulation: No results for input(s): "PT", "INR", "APTT" in the last 48 hours.  Lactic Acid:   Recent Labs   Lab 05/24/24  1316   LACTATE 2.0     Troponin:   Recent Labs   Lab 05/24/24  1035   TROPONINI 0.008     TSH:   Recent Labs   Lab 05/24/24  1035   TSH 1.625     Urine Culture: No results for input(s): "LABURIN" in the last 48 hours.  Urine Studies:   Recent Labs   Lab 05/24/24  1208   COLORU Yellow   APPEARANCEUA Cloudy*   PHUR 8.0   SPECGRAV 1.010   PROTEINUA 3+*   GLUCUA Negative   KETONESU Trace*   BILIRUBINUA 1+*   OCCULTUA 1+*   NITRITE Negative   UROBILINOGEN Negative   LEUKOCYTESUR 3+*   RBCUA 4   WBCUA >100*   BACTERIA Moderate*   HYALINECASTS 0       Significant Imaging: I have reviewed all pertinent imaging results/findings within the past 24 hours.    CXR: No acute process. No significant change.   Assessment/Plan:     * Near syncope  Likely 2/2 orthostatic hypotension vs vasovagal  Tele  Decrease carvedilol dose by half to 12.5mg nightly  Received 500cc IVF by EMS and 1L in ER        Acute cystitis without hematuria  Rocephin  Urine culture pending  Blood culture pending      Closed fracture of left hip  Weight bearing as tolerated per chart review  PT/OT      Dementia in other diseases classified elsewhere, unspecified severity, with mood disturbance  Patient with dementia with likely etiology of alzheimer's dementia. Dementia is moderate. The patient does not have signs of behavioral " disturbance. Continue non-pharmacologic interventions to prevent delirium (No VS between 11PM-5AM, activity during day, opening blinds, providing glasses/hearing aids, and up in chair during daytime). Will avoid narcotics and benzos unless absolutely necessary. PRN anti-psychotics are not prescribed to avoid self harm behaviors.    Chronic kidney disease, stage 3a  Creatine stable for now. BMP reviewed- noted Estimated Creatinine Clearance: 31.2 mL/min (based on SCr of 1.1 mg/dL). according to latest data. Based on current GFR, CKD stage is stage 3 - GFR 30-59.  Monitor UOP and serial BMP and adjust therapy as needed. Renally dose meds. Avoid nephrotoxic medications and procedures.    Chronic radicular pain of lower back  Cont home gabapentin 600mg nightly      Essential hypertension  Chronic, controlled. Latest blood pressure and vitals reviewed-     Temp:  [98.2 °F (36.8 °C)-99.2 °F (37.3 °C)]   Pulse:  []   Resp:  [17-19]   BP: (114-135)/(62-85)   SpO2:  [90 %-100 %] .   Home meds for hypertension were reviewed and noted below.   Hypertension Medications               carvediloL (COREG) 25 MG tablet Take 1 tablet (25 mg total) by mouth Daily.            While in the hospital, will manage blood pressure as follows; Adjust home antihypertensive regimen as follows- decrease carvedilol from 25mg to 12.5mg daily    Will utilize p.r.n. blood pressure medication only if patient's blood pressure greater than 180/110 and she develops symptoms such as worsening chest pain or shortness of breath.    Chronic atrial fibrillation  Patient with Long standing persistent (>12 months) atrial fibrillation which is controlled currently with Beta Blocker. Patient is currently in atrial fibrillation.[id number] Score: 3. Anticoagulation indicated. Anticoagulation done with home xarelto .    Major depressive disorder, recurrent episode, moderate  Patient has recurrent depression which is mild and is currently controlled. Will  Continue anti-depressant medications. We will not consult psychiatry at this time. Patient does not display psychosis at this time. Continue to monitor closely and adjust plan of care as needed.    Cont home sertraline 50mg daily    Hyperlipidemia  Cont home statin        VTE Risk Mitigation (From admission, onward)           Ordered     rivaroxaban tablet 15 mg  Daily         05/24/24 1544     IP VTE HIGH RISK PATIENT  Once         05/24/24 1418     Place sequential compression device  Until discontinued         05/24/24 1418                       On 05/24/2024, patient should be placed in hospital observation services under my care.             Miki Vasques MD  Department of Hospital Medicine  [place] [facility] (3rd Fl)

## 2024-05-24 NOTE — ASSESSMENT & PLAN NOTE
Patient with dementia with likely etiology of alzheimer's dementia. Dementia is moderate. The patient does not have signs of behavioral disturbance. Continue non-pharmacologic interventions to prevent delirium (No VS between 11PM-5AM, activity during day, opening blinds, providing glasses/hearing aids, and up in chair during daytime). Will avoid narcotics and benzos unless absolutely necessary. PRN anti-psychotics are not prescribed to avoid self harm behaviors.

## 2024-05-24 NOTE — SUBJECTIVE & OBJECTIVE
Past Medical History:   Diagnosis Date    Acute hypoxic respiratory failure 4/25/2024    Anticoagulant long-term use     Anxiety     Arthritis     Atrial fibrillation     2011- on xarelto    Back pain     S/p discectomy     Depression     Endometriosis, site unspecified     Hyperlipidemia     Hypertension     Hypertensive heart disease with heart failure 2/8/2023    Insomnia     Osteoporosis     Osteoporosis, unspecified     Urinary tract infection, site not specified        Past Surgical History:   Procedure Laterality Date    ADENOIDECTOMY      BACK SURGERY      1980 discectomy and 1989 scar tissue    BREAST BIOPSY Bilateral     negative    CAUDAL EPIDURAL STEROID INJECTION N/A 8/19/2022    Procedure: CAUDAL EPIDURAL STEROID INJECTION WITH CATHETER;  Surgeon: Radhika Thurman MD;  Location: [facility];  Service: Pain Management;  Laterality: N/A;    CHOLECYSTECTOMY      COLONOSCOPY  2005    COLONOSCOPY N/A 9/28/2017    Procedure: COLONOSCOPY;  Surgeon: Jeffrey Baptiste MD;  Location: [facility];  Service: Endoscopy;  Laterality: N/A;    EYE SURGERY      cataract bilaterally    HEMIARTHROPLASTY OF HIP Left 4/25/2024    Procedure: HEMIARTHROPLASTY, HIP;  Surgeon: Zac Alcantara MD;  Location: [facility];  Service: Orthopedics;  Laterality: Left;    HYSTERECTOMY  over 30 yr ago    DUB; total    INJECTION OF ANESTHETIC AGENT AROUND MEDIAL BRANCH NERVES INNERVATING LUMBAR FACET JOINT Bilateral 4/30/2021    Procedure: LUMBAR FACET JOINT BLOCK (L3-4,L4-5,L5-S1);  Surgeon: Radhika Thurman MD;  Location: [facility];  Service: Pain Management;  Laterality: Bilateral;    LEFT HEART CATHETERIZATION Left 10/5/2021    Procedure: Left heart cath;  Surgeon: Damian Wood MD;  [facility] C[facility];  Service: Cardiovascular;  Laterality: Left;    OOPHORECTOMY      SPINE SURGERY      TONSILLECTOMY      TRANSFORAMINAL EPIDURAL INJECTION OF STEROID Bilateral 1/8/2021    Procedure: TRANSFORAMINAL EPIDURAL STEROID INJECTION (L5);  Surgeon: Radhika  LEIGH ANN Thurman MD;  Location: [facility];  Service: Pain Management;  Laterality: Bilateral;    TRANSFORAMINAL EPIDURAL INJECTION OF STEROID Bilateral 3/12/2021    Procedure: TRANSFORAMINAL EPIDURAL STEROID INJECTION (L4);  Surgeon: Radhika Thurman MD;  Location: [facility];  Service: Pain Management;  Laterality: Bilateral;       Review of patient's allergies indicates:   Allergen Reactions    Tramadol Other (See Comments)     Dizziness, disoriented       No current facility-administered medications on file prior to encounter.     Current Outpatient Medications on File Prior to Encounter   Medication Sig    acetaminophen (TYLENOL) 500 MG tablet Take 2 tablets by mouth 3 (three) times daily as needed.    albuterol-ipratropium (DUO-NEB) 2.5 mg-0.5 mg/3 mL nebulizer solution Take 1 vial by nebulization every 6 hours as needed for shortness of breath    atorvastatin (LIPITOR) 10 MG tablet Take 1 tablet by mouth every evening.    carvediloL (COREG) 25 MG tablet Take 1 tablet (25 mg total) by mouth Daily. (Patient taking differently: Take 25 mg by mouth every evening.)    gabapentin (NEURONTIN) 600 MG tablet Take 1 tablet (600 mg total) by mouth every evening. (Patient taking differently: Take 600 mg by mouth every morning.)    guaiFENesin (MUCINEX) 600 mg 12 hr tablet Take 1,200 mg by mouth 2 (two) times daily as needed for Congestion.    rivaroxaban (XARELTO) 15 mg Tab Take 1 tablet (15 mg total) by mouth once daily. (Patient taking differently: Take 15 mg by mouth every evening.)    sertraline (ZOLOFT) 50 MG tablet Take 1 tablet by mouth every day    vit C,O-Or-ftkpz-lutein-zeaxan (EYE MULTIVIT, LUTEIN-ZEAXAN,) 504-96-28-2-5 mg Cap Take 1 tablet by mouth every evening.    [DISCONTINUED] multivitamin (THERAGRAN) per tablet Take 1 tablet by mouth once daily.    [DISCONTINUED] vit A/vit C/vit E/zinc/copper (PRESERVISION AREDS ORAL) Take by mouth.     Family History       Problem Relation (Age of Onset)    Breast cancer Cousin,  Maternal Aunt, Paternal Aunt    Coronary artery disease Mother    Depression Father    Heart disease Mother, Father    Hyperlipidemia Sister          Tobacco Use    Smoking status: Never    Smokeless tobacco: Never   Substance and Sexual Activity    Alcohol use: Yes     Alcohol/week: 0.8 standard drinks of alcohol     Types: 1 Standard drinks or equivalent per week     Comment: glass of wine ONCE A WEEK    Drug use: No    Sexual activity: Not Currently     Birth control/protection: Surgical, Post-menopausal, See Surgical Hx     Comment:      Review of Systems   Unable to perform ROS: Dementia     Objective:     Vital Signs (Most Recent):  Temp: 98.6 °F (37 °C) (05/24/24 1421)  Pulse: 108 (05/24/24 1421)  Resp: 19 (05/24/24 1421)  BP: 114/62 (05/24/24 1421)  SpO2: 95 % (05/24/24 1426) Vital Signs (24h Range):  Temp:  [98.2 °F (36.8 °C)-99.2 °F (37.3 °C)] 98.6 °F (37 °C)  Pulse:  [] 108  Resp:  [17-19] 19  SpO2:  [90 %-100 %] 95 %  BP: (114-135)/(62-85) 114/62     Weight: 67.1 kg (147 lb 14.9 oz)  Body mass index is 23.88 kg/m².     Physical Exam  Vitals and nursing note reviewed.   Constitutional:       General: She is not in acute distress.     Comments: Sleeping, but easily arousable   HENT:      Head: Normocephalic and atraumatic.      Mouth/Throat:      Mouth: Mucous membranes are moist.   Eyes:      Extraocular Movements: Extraocular movements intact.      Conjunctiva/sclera: Conjunctivae normal.      Pupils: Pupils are equal, round, and reactive to light.   Cardiovascular:      Rate and Rhythm: Normal rate and regular rhythm.      Heart sounds: Normal heart sounds. No murmur heard.  Pulmonary:      Effort: Pulmonary effort is normal. No respiratory distress.      Breath sounds: Normal breath sounds.   Abdominal:      General: Bowel sounds are normal. There is no distension.      Palpations: Abdomen is soft.      Tenderness: There is no abdominal tenderness.   Musculoskeletal:      Cervical back: Neck  "supple.      Right lower leg: No edema.      Left lower leg: No edema.   Skin:     General: Skin is warm and dry.   Neurological:      General: No focal deficit present.      Mental Status: Mental status is at baseline. She is disoriented.              CRANIAL NERVES     CN III, IV, VI   Pupils are equal, round, and reactive to light.       Significant Labs: All pertinent labs within the past 24 hours have been reviewed.  Blood Culture: No results for input(s): "LABBLOO" in the last 48 hours.  BMP:   Recent Labs   Lab 05/24/24  1035   *      K 4.4      CO2 23   BUN 13   CREATININE 1.1   CALCIUM 9.0     CBC:   Recent Labs   Lab 05/24/24  1035   WBC 11.35   HGB 11.9*   HCT 37.0        CMP:   Recent Labs   Lab 05/24/24  1035      K 4.4      CO2 23   *   BUN 13   CREATININE 1.1   CALCIUM 9.0   PROT 6.6   ALBUMIN 3.2*   BILITOT 0.9   ALKPHOS 80   AST 22   ALT 10   ANIONGAP 12     Cardiac Markers: No results for input(s): "CKMB", "MYOGLOBIN", "BNP", "TROPISTAT" in the last 48 hours.  Coagulation: No results for input(s): "PT", "INR", "APTT" in the last 48 hours.  Lactic Acid:   Recent Labs   Lab 05/24/24  1316   LACTATE 2.0     Troponin:   Recent Labs   Lab 05/24/24  1035   TROPONINI 0.008     TSH:   Recent Labs   Lab 05/24/24  1035   TSH 1.625     Urine Culture: No results for input(s): "LABURIN" in the last 48 hours.  Urine Studies:   Recent Labs   Lab 05/24/24  1208   COLORU Yellow   APPEARANCEUA Cloudy*   PHUR 8.0   SPECGRAV 1.010   PROTEINUA 3+*   GLUCUA Negative   KETONESU Trace*   BILIRUBINUA 1+*   OCCULTUA 1+*   NITRITE Negative   UROBILINOGEN Negative   LEUKOCYTESUR 3+*   RBCUA 4   WBCUA >100*   BACTERIA Moderate*   HYALINECASTS 0       Significant Imaging: I have reviewed all pertinent imaging results/findings within the past 24 hours.    CXR: No acute process. No significant change.   "

## 2024-05-24 NOTE — ASSESSMENT & PLAN NOTE
Patient with Long standing persistent (>12 months) atrial fibrillation which is controlled currently with Beta Blocker. Patient is currently in atrial fibrillation.[id number] Score: 3. Anticoagulation indicated. Anticoagulation done with home xarelto .

## 2024-05-24 NOTE — ASSESSMENT & PLAN NOTE
Patient has recurrent depression which is mild and is currently controlled. Will Continue anti-depressant medications. We will not consult psychiatry at this time. Patient does not display psychosis at this time. Continue to monitor closely and adjust plan of care as needed.    Cont home sertraline 50mg daily

## 2024-05-24 NOTE — HPI
89 yo F with PMHx of chronic AF on Xarelto, HTN, HLD, depression, dementia, and frequent falls who presented to ED following a syncopal episode at home. Patient is only oriented to self at baseline and requires assistance with all ADLs due to dementia per chart review. On my eval, pt is alone in room. Per ER notes: daughter states that she was sitting there, became lightheaded and fell forward while sitting. Patient did not fall to the ground. She immediately regained consciousness. Denies any chest pain or shortness breath associated with this. No abdominal pain, nausea, vomiting, diarrhea. Patient reports she feels at her baseline. EMS noted a blood pressure of systolic 80, gave 500 mL of fluid prior to arrival. She was found to have UTI in ER.

## 2024-05-24 NOTE — ASSESSMENT & PLAN NOTE
Likely 2/2 orthostatic hypotension vs vasovagal  Tele  Decrease carvedilol dose by half to 12.5mg nightly  Received 500cc IVF by EMS and 1L in ER

## 2024-05-24 NOTE — ASSESSMENT & PLAN NOTE
Chronic, controlled. Latest blood pressure and vitals reviewed-     Temp:  [98.2 °F (36.8 °C)-99.2 °F (37.3 °C)]   Pulse:  []   Resp:  [17-19]   BP: (114-135)/(62-85)   SpO2:  [90 %-100 %] .   Home meds for hypertension were reviewed and noted below.   Hypertension Medications               carvediloL (COREG) 25 MG tablet Take 1 tablet (25 mg total) by mouth Daily.            While in the hospital, will manage blood pressure as follows; Adjust home antihypertensive regimen as follows- decrease carvedilol from 25mg to 12.5mg daily    Will utilize p.r.n. blood pressure medication only if patient's blood pressure greater than 180/110 and she develops symptoms such as worsening chest pain or shortness of breath.

## 2024-05-24 NOTE — PHARMACY MED REC
"Admission Medication History     The home medication history was taken by Regina Hanley CPhT.    Medication history obtained from, Patient's Daughter Verified    You may go to "Admission" then "Reconcile Home Medications" tabs to review and/or act upon these items.     The home medication list has been updated by the Pharmacy department.   Please read ALL comments highlighted in yellow.   Please address this information as you see fit.    Feel free to contact us if you have any questions or require assistance.      The medications listed below were removed from the home medication list.  Please reorder if appropriate:  Patient reports no longer taking the following medication(s):  Multivitamin         Regina Hanley CPhT.  Ext [phone]               .          "

## 2024-05-24 NOTE — ASSESSMENT & PLAN NOTE
Creatine stable for now. BMP reviewed- noted Estimated Creatinine Clearance: 31.2 mL/min (based on SCr of 1.1 mg/dL). according to latest data. Based on current GFR, CKD stage is stage 3 - GFR 30-59.  Monitor UOP and serial BMP and adjust therapy as needed. Renally dose meds. Avoid nephrotoxic medications and procedures.

## 2024-05-24 NOTE — PLAN OF CARE
Problem: Adult Inpatient Plan of Care  Goal: Plan of Care Review  Outcome: Progressing  Goal: Patient-Specific Goal (Individualized)  Outcome: Progressing  Goal: Absence of Hospital-Acquired Illness or Injury  Outcome: Progressing  Goal: Optimal Comfort and Wellbeing  Outcome: Progressing  Goal: Readiness for Transition of Care  Outcome: Progressing     Problem: Wound  Goal: Optimal Coping  Outcome: Progressing  Goal: Optimal Functional Ability  Outcome: Progressing  Goal: Absence of Infection Signs and Symptoms  Outcome: Progressing  Goal: Improved Oral Intake  Outcome: Progressing  Goal: Optimal Pain Control and Function  Outcome: Progressing  Goal: Skin Health and Integrity  Outcome: Progressing  Goal: Optimal Wound Healing  Outcome: Progressing     Problem: Skin Injury Risk Increased  Goal: Skin Health and Integrity  Outcome: Progressing     Problem: Fall Injury Risk  Goal: Absence of Fall and Fall-Related Injury  Outcome: Progressing     Problem: UTI (Urinary Tract Infection)  Goal: Improved Infection Symptoms  Outcome: Progressing

## 2024-05-25 LAB
ANION GAP SERPL CALC-SCNC: 13 MMOL/L (ref 8–16)
BUN SERPL-MCNC: 15 MG/DL (ref 10–30)
CALCIUM SERPL-MCNC: 9.3 MG/DL (ref 8.7–10.5)
CHLORIDE SERPL-SCNC: 103 MMOL/L (ref 95–110)
CO2 SERPL-SCNC: 22 MMOL/L (ref 23–29)
CREAT SERPL-MCNC: 0.8 MG/DL (ref 0.5–1.4)
EST. GFR  (NO RACE VARIABLE): >60 ML/MIN/1.73 M^2
GLUCOSE SERPL-MCNC: 111 MG/DL (ref 70–110)
POTASSIUM SERPL-SCNC: 3.4 MMOL/L (ref 3.5–5.1)
SODIUM SERPL-SCNC: 138 MMOL/L (ref 136–145)

## 2024-05-25 PROCEDURE — 63600175 PHARM REV CODE 636 W HCPCS: Performed by: STUDENT IN AN ORGANIZED HEALTH CARE EDUCATION/TRAINING PROGRAM

## 2024-05-25 PROCEDURE — 80048 BASIC METABOLIC PNL TOTAL CA: CPT | Performed by: STUDENT IN AN ORGANIZED HEALTH CARE EDUCATION/TRAINING PROGRAM

## 2024-05-25 PROCEDURE — 11000001 HC ACUTE MED/SURG PRIVATE ROOM

## 2024-05-25 PROCEDURE — 99232 SBSQ HOSP IP/OBS MODERATE 35: CPT | Mod: ,,, | Performed by: STUDENT IN AN ORGANIZED HEALTH CARE EDUCATION/TRAINING PROGRAM

## 2024-05-25 PROCEDURE — 25000003 PHARM REV CODE 250: Performed by: STUDENT IN AN ORGANIZED HEALTH CARE EDUCATION/TRAINING PROGRAM

## 2024-05-25 PROCEDURE — 36415 COLL VENOUS BLD VENIPUNCTURE: CPT | Performed by: STUDENT IN AN ORGANIZED HEALTH CARE EDUCATION/TRAINING PROGRAM

## 2024-05-25 PROCEDURE — 94761 N-INVAS EAR/PLS OXIMETRY MLT: CPT

## 2024-05-25 PROCEDURE — 97163 PT EVAL HIGH COMPLEX 45 MIN: CPT

## 2024-05-25 PROCEDURE — 99900031 HC PATIENT EDUCATION (STAT)

## 2024-05-25 PROCEDURE — 99900035 HC TECH TIME PER 15 MIN (STAT)

## 2024-05-25 RX ORDER — CARVEDILOL 12.5 MG/1
12.5 TABLET ORAL 2 TIMES DAILY WITH MEALS
Status: DISCONTINUED | OUTPATIENT
Start: 2024-05-25 | End: 2024-05-27 | Stop reason: HOSPADM

## 2024-05-25 RX ORDER — ACETAMINOPHEN 325 MG/1
650 TABLET ORAL EVERY 6 HOURS PRN
Status: DISCONTINUED | OUTPATIENT
Start: 2024-05-25 | End: 2024-05-27 | Stop reason: HOSPADM

## 2024-05-25 RX ADMIN — CEFTRIAXONE SODIUM 1 G: 1 INJECTION, POWDER, FOR SOLUTION INTRAMUSCULAR; INTRAVENOUS at 12:05

## 2024-05-25 RX ADMIN — RIVAROXABAN 15 MG: 15 TABLET, FILM COATED ORAL at 08:05

## 2024-05-25 RX ADMIN — GABAPENTIN 600 MG: 600 TABLET, COATED ORAL at 08:05

## 2024-05-25 RX ADMIN — ACETAMINOPHEN 650 MG: 325 TABLET ORAL at 08:05

## 2024-05-25 RX ADMIN — CARVEDILOL 12.5 MG: 12.5 TABLET, FILM COATED ORAL at 05:05

## 2024-05-25 RX ADMIN — SERTRALINE HYDROCHLORIDE 50 MG: 50 TABLET ORAL at 08:05

## 2024-05-25 RX ADMIN — ATORVASTATIN CALCIUM 10 MG: 10 TABLET, FILM COATED ORAL at 08:05

## 2024-05-25 NOTE — HOSPITAL COURSE
5/25/24: no acute events overnight. She is much more alert today. Back to her baseline mental status per daughter. Complains of some arthralgias that are chronic. She has no recollection of the events leading to hospitalization. VSS    5/26/24: no acute events overnight. Mental status at baseline. No new complaints. Temp 100.4 yesteraday evening but afebrile sense. Urine culture proteus, awaiting sensitivities. Suspect DC in AM, daughter is not comfortable with DC home today due to fever last night.    5/27/24  She is afebrile   Her BP is stable.  Her cultures for urine are back     PROTEUS MIRABILIS  >100,000 cfu/ml  P       Urine Culture, Routine      Abnormal   GRAM NEGATIVE PABLO  > 100,000 cfu/ml  Identification and susceptibility pending  P      Resulting Agency [facility]        Susceptibility     Proteus mirabilis     CULTURE, URINE (Preliminary)     Amox/K Clav'ate <=8/4 mcg/mL Sensitive     Amp/Sulbactam <=8/4 mcg/mL Sensitive     Ampicillin <=8 mcg/mL Sensitive     Cefazolin <=2 mcg/mL Sensitive     Cefepime <=2 mcg/mL Sensitive     Ceftriaxone <=1 mcg/mL Sensitive     Ciprofloxacin <=1 mcg/mL Sensitive     Ertapenem <=0.5 mcg/mL Sensitive     Gentamicin <=4 mcg/mL Sensitive     Levofloxacin <=2 mcg/mL Sensitive     Meropenem <=1 mcg/mL Sensitive     Piperacillin/Tazo <=16 mcg/mL Sensitive     Tobramycin <=4 mcg/mL Sensitive     Trimeth/Sulfa <=2/38 mcg/mL Sensitive             I talked to otis her daughter ;discussed plan .  Will dc home on po antibiotics   She has sitters at home   Will order home health and home PT

## 2024-05-25 NOTE — ASSESSMENT & PLAN NOTE
Patient with Long standing persistent (>12 months) atrial fibrillation which is controlled currently with Beta Blocker. Patient is currently in atrial fibrillation.[id number] Score: 3. Anticoagulation indicated. Anticoagulation done with home xarelto .

## 2024-05-25 NOTE — PLAN OF CARE
Problem: Adult Inpatient Plan of Care  Goal: Plan of Care Review  Outcome: Progressing  Flowsheets (Taken 5/25/2024 0128)  Plan of Care Reviewed With: patient  Goal: Patient-Specific Goal (Individualized)  Outcome: Progressing  Goal: Absence of Hospital-Acquired Illness or Injury  Outcome: Progressing  Goal: Optimal Comfort and Wellbeing  Outcome: Progressing  Goal: Readiness for Transition of Care  Outcome: Progressing     Problem: Wound  Goal: Optimal Coping  Outcome: Progressing  Goal: Optimal Functional Ability  Outcome: Progressing  Goal: Absence of Infection Signs and Symptoms  Outcome: Progressing  Goal: Improved Oral Intake  Outcome: Progressing  Goal: Optimal Pain Control and Function  Outcome: Progressing  Goal: Skin Health and Integrity  Outcome: Progressing  Goal: Optimal Wound Healing  Outcome: Progressing     Problem: Skin Injury Risk Increased  Goal: Skin Health and Integrity  Outcome: Progressing     Problem: Fall Injury Risk  Goal: Absence of Fall and Fall-Related Injury  Outcome: Progressing     Problem: UTI (Urinary Tract Infection)  Goal: Improved Infection Symptoms  Outcome: Progressing

## 2024-05-25 NOTE — PT/OT/SLP EVAL
"Physical Therapy Evaluation    Patient Name: Caroline Arguello   MRN: [id number]  Recent Surgery: * No surgery found *      Recommendations:     Discharge Recommendations: Moderate Intensity Therapy   Discharge Equipment Recommendations: to be determined by next level of care   Barriers to discharge: Increased level of assist, Inaccessible home, Decreased caregiver support, and Ongoing medical treatment    Assessment:     Caroline Arguello is a 89 y.o. female admitted with a medical diagnosis of Near syncope. She presents with the following impairments/functional limitations: weakness, impaired endurance, impaired self care skills, impaired functional mobility, decreased lower extremity function, decreased safety awareness, decreased ROM, impaired cognition that are causing the pt to have decreased independence and safety with all gait and mobility tasks.  Pt requires skilled PT treatment to increase independence and safety with all gait and mobility tasks to return to home.    Rehab Prognosis: Poor; patient would benefit from acute PT services to address these deficits and reach maximum level of function.    Plan:     During this hospitalization, patient to be seen 5 x/week to address the above listed problems via gait training, therapeutic activities, therapeutic exercises    Plan of Care Expires: 06/06/24    Subjective     Chief Complaint: none stated  Patient Comments/Goals: none stated  Pain/Comfort:  Pain Rating 1: 0/10    Social History:  Living Environment: Unable to assess due to pt's decreased cognition; stated she lived with her "mother"  Prior Level of Function: Prior to admission, patient with undetermined level of function  Equipment Used at Home: wheelchair, shower chair, nebulizer  DME owned (not currently used): none  Assistance Upon Discharge: unknown    Objective:     Communicated with pt prior to session. Patient found HOB elevated with peripheral IV upon PT entry to room.    General Precautions: " Standard, fall   Orthopedic Precautions: N/A   Braces: N/A      Exams:  Cognition: Patient is oriented to Person  RLE ROM: Deficits: to all AROM  RLE Strength: Deficits: 2/5 grossly  LLE ROM: Deficits: to all AROM  LLE Strength: Deficits: 2/5 grossly  Fine Motor Coordination:    -       Intact  Gross Motor Coordination: WFL  Postural Exam: Patient presented with the following abnormalities:    -       Rounded shoulders  -       Forward head  -       Abnormal trunk flexion  -       Kyphosis  Sensation:    -       Intact  Skin Integrity/Edema:     -       Skin integrity: Visible skin intact  -       Edema: None noted BLEs    Functional Mobility:  Gait belt applied - No  Bed Mobility  Rolling Left: dependence   Rolling Right: dependence   Scooting: dependence   Supine to Sit: dependence  for LE management and trunk management  Sit to Supine: dependence  for LE management and trunk management    Therapeutic Activities and Exercises:   Patient educated on role of acute care PT and PT POC    AM-PAC 6 CLICK MOBILITY  Total Score:7    Patient left HOB elevated with all lines intact and call button in reach.    GOALS:   Multidisciplinary Problems       Physical Therapy Goals          Problem: Physical Therapy    Goal Priority Disciplines Outcome Goal Variances Interventions   Physical Therapy Goal     PT, PT/OT Progressing     Description: Patient will increase functional independence with mobility by performin. Supine to sit with Contact Guard Assistance  2. Sit to supine with Contact Guard Assistance  3. Rolling to Left and Right with Contact Guard Assistance.  4. Sit to stand transfer with Minimal Assistance  5. Bed to chair transfer with Minimal Assistance using Rolling Walker  6. Gait  x 50 feet with Minimal Assistance using Rolling Walker.   7. Lower extremity exercise program x10 reps per handout, with independence                         History:     Past Medical History:   Diagnosis Date    Acute hypoxic  respiratory failure 4/25/2024    Anticoagulant long-term use     Anxiety     Arthritis     Atrial fibrillation     2011- on xarelto    Back pain     S/p discectomy     Depression     Endometriosis, site unspecified     Hyperlipidemia     Hypertension     Hypertensive heart disease with heart failure 2/8/2023    Insomnia     Osteoporosis     Osteoporosis, unspecified     Urinary tract infection, site not specified        Past Surgical History:   Procedure Laterality Date    ADENOIDECTOMY      BACK SURGERY      1980 discectomy and 1989 scar tissue    BREAST BIOPSY Bilateral     negative    CAUDAL EPIDURAL STEROID INJECTION N/A 8/19/2022    Procedure: CAUDAL EPIDURAL STEROID INJECTION WITH CATHETER;  Surgeon: Radhika Thurman MD;  Location: [facility] OR;  Service: Pain Management;  Laterality: N/A;    CHOLECYSTECTOMY      COLONOSCOPY  2005    COLONOSCOPY N/A 9/28/2017    Procedure: COLONOSCOPY;  Surgeon: Jeffrey Baptiste MD;  Location: [facility];  Service: Endoscopy;  Laterality: N/A;    EYE SURGERY      cataract bilaterally    HEMIARTHROPLASTY OF HIP Left 4/25/2024    Procedure: HEMIARTHROPLASTY, HIP;  Surgeon: Zac Alcantara MD;  Location: [facility] OR;  Service: Orthopedics;  Laterality: Left;    HYSTERECTOMY  over 30 yr ago    DUB; total    INJECTION OF ANESTHETIC AGENT AROUND MEDIAL BRANCH NERVES INNERVATING LUMBAR FACET JOINT Bilateral 4/30/2021    Procedure: LUMBAR FACET JOINT BLOCK (L3-4,L4-5,L5-S1);  Surgeon: Radhika Thurman MD;  Location: [facility];  Service: Pain Management;  Laterality: Bilateral;    LEFT HEART CATHETERIZATION Left 10/5/2021    Procedure: Left heart cath;  Surgeon: Damian Wood MD;  Location: [facility] CATH;  Service: Cardiovascular;  Laterality: Left;    OOPHORECTOMY      SPINE SURGERY      TONSILLECTOMY      TRANSFORAMINAL EPIDURAL INJECTION OF STEROID Bilateral 1/8/2021    Procedure: TRANSFORAMINAL EPIDURAL STEROID INJECTION (L5);  Surgeon: Radhika Thurman MD;  Location: [facility] OR;  Service: Pain  Management;  Laterality: Bilateral;    TRANSFORAMINAL EPIDURAL INJECTION OF STEROID Bilateral 3/12/2021    Procedure: TRANSFORAMINAL EPIDURAL STEROID INJECTION (L4);  Surgeon: Radhika Thurman MD;  Location: [facility] OR;  Service: Pain Management;  Laterality: Bilateral;       Time Tracking:     PT Received On: 05/25/24  PT Start Time: 0930  PT Stop Time: 0940  PT Total Time (min): 10 min     Billable Minutes: Evaluation 10    5/25/2024

## 2024-05-25 NOTE — PROGRESS NOTES
[facility] ([facility])  [facility] Medicine  Progress Note    Patient Name: Caroline Arguello  MRN: [id number]  Patient Class: IP- Inpatient   Admission Date: 5/24/2024  Length of Stay: 0 days  Attending Physician: Miki Vasques MD  Primary Care Provider: Ev Guevara MD        Subjective:     Principal Problem:Near syncope        HPI:  89 yo F with PMHx of chronic AF on Xarelto, HTN, HLD, depression, dementia, and frequent falls who presented to ED following a syncopal episode at home. Patient is only oriented to self at baseline and requires assistance with all ADLs due to dementia per chart review. On my eval, pt is alone in room. Per ER notes: daughter states that she was sitting there, became lightheaded and fell forward while sitting. Patient did not fall to the ground. She immediately regained consciousness. Denies any chest pain or shortness breath associated with this. No abdominal pain, nausea, vomiting, diarrhea. Patient reports she feels at her baseline. EMS noted a blood pressure of systolic 80, gave 500 mL of fluid prior to arrival. She was found to have UTI in ER.    Overview/Hospital Course:  5/25/24: no acute events overnight. She is much more alert today. Back to her baseline mental status per daughter. Complains of some arthralgias that are chronic. She has no recollection of the events leading to hospitalization. VSS    Past Medical History:   Diagnosis Date    Acute hypoxic respiratory failure 4/25/2024    Anticoagulant long-term use     Anxiety     Arthritis     Atrial fibrillation     2011- on xarelto    Back pain     S/p discectomy     Depression     Endometriosis, site unspecified     Hyperlipidemia     Hypertension     Hypertensive heart disease with heart failure 2/8/2023    Insomnia     Osteoporosis     Osteoporosis, unspecified     Urinary tract infection, site not specified        Past Surgical History:   Procedure Laterality Date    ADENOIDECTOMY      BACK SURGERY      1980  discectomy and 1989 scar tissue    BREAST BIOPSY Bilateral     negative    CAUDAL EPIDURAL STEROID INJECTION N/A 8/19/2022    Procedure: CAUDAL EPIDURAL STEROID INJECTION WITH CATHETER;  Surgeon: Radhika Thurman MD;  Location: [facility];  Service: Pain Management;  Laterality: N/A;    CHOLECYSTECTOMY      COLONOSCOPY  2005    COLONOSCOPY N/A 9/28/2017    Procedure: COLONOSCOPY;  Surgeon: Jeffrey Baptiste MD;  Location: [facility];  Service: Endoscopy;  Laterality: N/A;    EYE SURGERY      cataract bilaterally    HEMIARTHROPLASTY OF HIP Left 4/25/2024    Procedure: HEMIARTHROPLASTY, HIP;  Surgeon: Zac Alcantara MD;  Location: [facility];  Service: Orthopedics;  Laterality: Left;    HYSTERECTOMY  over 30 yr ago    DUB; total    INJECTION OF ANESTHETIC AGENT AROUND MEDIAL BRANCH NERVES INNERVATING LUMBAR FACET JOINT Bilateral 4/30/2021    Procedure: LUMBAR FACET JOINT BLOCK (L3-4,L4-5,L5-S1);  Surgeon: Radhika Thurman MD;  Location: [facility];  Service: Pain Management;  Laterality: Bilateral;    LEFT HEART CATHETERIZATION Left 10/5/2021    Procedure: Left heart cath;  Surgeon: Damian Wood MD;  Location: [facility];  Service: Cardiovascular;  Laterality: Left;    OOPHORECTOMY      SPINE SURGERY      TONSILLECTOMY      TRANSFORAMINAL EPIDURAL INJECTION OF STEROID Bilateral 1/8/2021    Procedure: TRANSFORAMINAL EPIDURAL STEROID INJECTION (L5);  Surgeon: Radhika Thurman MD;  Location: [facility];  Service: Pain Management;  Laterality: Bilateral;    TRANSFORAMINAL EPIDURAL INJECTION OF STEROID Bilateral 3/12/2021    Procedure: TRANSFORAMINAL EPIDURAL STEROID INJECTION (L4);  Surgeon: Radhika Thurman MD;  Location: [facility];  Service: Pain Management;  Laterality: Bilateral;       Review of patient's allergies indicates:   Allergen Reactions    Tramadol Other (See Comments)     Dizziness, disoriented       No current facility-administered medications on file prior to encounter.     Current Outpatient Medications on File Prior to  Encounter   Medication Sig    acetaminophen (TYLENOL) 500 MG tablet Take 2 tablets by mouth 3 (three) times daily as needed.    albuterol-ipratropium (DUO-NEB) 2.5 mg-0.5 mg/3 mL nebulizer solution Take 1 vial by nebulization every 6 hours as needed for shortness of breath    atorvastatin (LIPITOR) 10 MG tablet Take 1 tablet by mouth every evening.    carvediloL (COREG) 25 MG tablet Take 1 tablet (25 mg total) by mouth Daily. (Patient taking differently: Take 25 mg by mouth every evening.)    gabapentin (NEURONTIN) 600 MG tablet Take 1 tablet (600 mg total) by mouth every evening. (Patient taking differently: Take 600 mg by mouth every morning.)    guaiFENesin (MUCINEX) 600 mg 12 hr tablet Take 1,200 mg by mouth 2 (two) times daily as needed for Congestion.    rivaroxaban (XARELTO) 15 mg Tab Take 1 tablet (15 mg total) by mouth once daily. (Patient taking differently: Take 15 mg by mouth every evening.)    sertraline (ZOLOFT) 50 MG tablet Take 1 tablet by mouth every day    vit C,R-Lt-webki-lutein-zeaxan (EYE MULTIVIT, LUTEIN-ZEAXAN,) 286-37-85-2-5 mg Cap Take 1 tablet by mouth every evening.     Family History       Problem Relation (Age of Onset)    Breast cancer Cousin, Maternal Aunt, Paternal Aunt    Coronary artery disease Mother    Depression Father    Heart disease Mother, Father    Hyperlipidemia Sister          Tobacco Use    Smoking status: Never    Smokeless tobacco: Never   Substance and Sexual Activity    Alcohol use: Yes     Alcohol/week: 0.8 standard drinks of alcohol     Types: 1 Standard drinks or equivalent per week     Comment: glass of wine ONCE A WEEK    Drug use: No    Sexual activity: Not Currently     Birth control/protection: Surgical, Post-menopausal, See Surgical Hx     Comment:      Review of Systems   Unable to perform ROS: Dementia   Constitutional:         Much more alert; pleasantly demented     Objective:     Vital Signs (Most Recent):  Temp: 97.8 °F (36.6 °C) (05/25/24  0741)  Pulse: 107 (05/25/24 0741)  Resp: 18 (05/25/24 0741)  BP: (!) 151/67 (05/25/24 0741)  SpO2: (!) 92 % (05/25/24 0741) Vital Signs (24h Range):  Temp:  [97.1 °F (36.2 °C)-99.2 °F (37.3 °C)] 97.8 °F (36.6 °C)  Pulse:  [] 107  Resp:  [18-22] 18  SpO2:  [90 %-100 %] 92 %  BP: (114-156)/(58-85) 151/67     Weight: 67.1 kg (147 lb 14.9 oz)  Body mass index is 23.88 kg/m².     Physical Exam  Vitals and nursing note reviewed.   Constitutional:       General: She is not in acute distress.     Comments: Sleeping, but easily arousable   HENT:      Head: Normocephalic and atraumatic.      Mouth/Throat:      Mouth: Mucous membranes are moist.   Eyes:      Extraocular Movements: Extraocular movements intact.      Conjunctiva/sclera: Conjunctivae normal.      Pupils: Pupils are equal, round, and reactive to light.   Cardiovascular:      Rate and Rhythm: Normal rate and regular rhythm.      Heart sounds: Normal heart sounds. No murmur heard.  Pulmonary:      Effort: Pulmonary effort is normal. No respiratory distress.      Breath sounds: Normal breath sounds.   Abdominal:      General: Bowel sounds are normal. There is no distension.      Palpations: Abdomen is soft.      Tenderness: There is no abdominal tenderness.   Musculoskeletal:      Cervical back: Neck supple.      Right lower leg: No edema.      Left lower leg: No edema.   Skin:     General: Skin is warm and dry.   Neurological:      General: No focal deficit present.      Mental Status: Mental status is at baseline. She is disoriented.              CRANIAL NERVES     CN III, IV, VI   Pupils are equal, round, and reactive to light.       Significant Labs: All pertinent labs within the past 24 hours have been reviewed.  Blood Culture:   Recent Labs   Lab 05/24/24  1315 05/24/24  1316   LABBLOO No Growth to date No Growth to date     BMP:   Recent Labs   Lab 05/24/24  1035   *      K 4.4      CO2 23   BUN 13   CREATININE 1.1   CALCIUM 9.0  "    CBC:   Recent Labs   Lab 05/24/24  1035   WBC 11.35   HGB 11.9*   HCT 37.0        CMP:   Recent Labs   Lab 05/24/24  1035      K 4.4      CO2 23   *   BUN 13   CREATININE 1.1   CALCIUM 9.0   PROT 6.6   ALBUMIN 3.2*   BILITOT 0.9   ALKPHOS 80   AST 22   ALT 10   ANIONGAP 12     Cardiac Markers: No results for input(s): "CKMB", "MYOGLOBIN", "BNP", "TROPISTAT" in the last 48 hours.  Coagulation: No results for input(s): "PT", "INR", "APTT" in the last 48 hours.  Lactic Acid:   Recent Labs   Lab 05/24/24  1316   LACTATE 2.0     Troponin:   Recent Labs   Lab 05/24/24  1035   TROPONINI 0.008     TSH:   Recent Labs   Lab 05/24/24  1035   TSH 1.625     Urine Culture: No results for input(s): "LABURIN" in the last 48 hours.  Urine Studies:   Recent Labs   Lab 05/24/24  1208   COLORU Yellow   APPEARANCEUA Cloudy*   PHUR 8.0   SPECGRAV 1.010   PROTEINUA 3+*   GLUCUA Negative   KETONESU Trace*   BILIRUBINUA 1+*   OCCULTUA 1+*   NITRITE Negative   UROBILINOGEN Negative   LEUKOCYTESUR 3+*   RBCUA 4   WBCUA >100*   BACTERIA Moderate*   HYALINECASTS 0       Significant Imaging: I have reviewed all pertinent imaging results/findings within the past 24 hours.    CXR: No acute process. No significant change.     Assessment/Plan:      * Near syncope  Likely 2/2 orthostatic hypotension vs vasovagal  Tele  Adjust carvedilol dose to 12.5mg BIF (previously on 25mg nightly)          Acute cystitis without hematuria  Rocephin day 2  Urine culture pending  Blood culture NGTD  Mental status much improved      Closed fracture of left hip  Weight bearing as tolerated per chart review  PT/OT      Dementia in other diseases classified elsewhere, unspecified severity, with mood disturbance  Patient with dementia with likely etiology of alzheimer's dementia. Dementia is moderate. The patient does not have signs of behavioral disturbance. Continue non-pharmacologic interventions to prevent delirium (No VS between " 11PM-5AM, activity during day, opening blinds, providing glasses/hearing aids, and up in chair during daytime). Will avoid narcotics and benzos unless absolutely necessary. PRN anti-psychotics are not prescribed to avoid self harm behaviors.    Chronic kidney disease, stage 3a  Creatine stable for now. BMP reviewed- noted Estimated Creatinine Clearance: 31.2 mL/min (based on SCr of 1.1 mg/dL). according to latest data. Based on current GFR, CKD stage is stage 3 - GFR 30-59.  Monitor UOP and serial BMP and adjust therapy as needed. Renally dose meds. Avoid nephrotoxic medications and procedures.    Chronic radicular pain of lower back  Cont home gabapentin 600mg nightly      Essential hypertension  Chronic, controlled. Latest blood pressure and vitals reviewed-     Temp:  [97.1 °F (36.2 °C)-99.2 °F (37.3 °C)]   Pulse:  []   Resp:  [18-22]   BP: (114-156)/(58-85)   SpO2:  [90 %-100 %] .   Home meds for hypertension were reviewed and noted below.   Hypertension Medications               carvediloL (COREG) 25 MG tablet Take 1 tablet (25 mg total) by mouth Daily.            While in the hospital, will manage blood pressure as follows; Adjust home antihypertensive regimen as follows- change carvedilol to 12.5mg BID    Will utilize p.r.n. blood pressure medication only if patient's blood pressure greater than 180/110 and she develops symptoms such as worsening chest pain or shortness of breath.    Chronic atrial fibrillation  Patient with Long standing persistent (>12 months) atrial fibrillation which is controlled currently with Beta Blocker. Patient is currently in atrial fibrillation.[id number] Score: 3. Anticoagulation indicated. Anticoagulation done with home xarelto .    Major depressive disorder, recurrent episode, moderate  Patient has recurrent depression which is mild and is currently controlled. Will Continue anti-depressant medications. We will not consult psychiatry at this time. Patient does not display  psychosis at this time. Continue to monitor closely and adjust plan of care as needed.    Cont home sertraline 50mg daily    Hyperlipidemia  Cont home statin        VTE Risk Mitigation (From admission, onward)           Ordered     rivaroxaban tablet 15 mg  Daily         05/24/24 1544     IP VTE HIGH RISK PATIENT  Once         05/24/24 1418     Place sequential compression device  Until discontinued         05/24/24 1418                    Discharge Planning   LETICIA:      Code Status: Full Code   Is the patient medically ready for discharge?:     Reason for patient still in hospital (select all that apply): Patient trending condition, Laboratory test, and Treatment                     Miki Vasques MD  Department of Hospital Medicine   [facility] - Med Surg (3rd Fl)

## 2024-05-25 NOTE — SUBJECTIVE & OBJECTIVE
Past Medical History:   Diagnosis Date    Acute hypoxic respiratory failure 4/25/2024    Anticoagulant long-term use     Anxiety     Arthritis     Atrial fibrillation     2011- on xarelto    Back pain     S/p discectomy     Depression     Endometriosis, site unspecified     Hyperlipidemia     Hypertension     Hypertensive heart disease with heart failure 2/8/2023    Insomnia     Osteoporosis     Osteoporosis, unspecified     Urinary tract infection, site not specified        Past Surgical History:   Procedure Laterality Date    ADENOIDECTOMY      BACK SURGERY      1980 discectomy and 1989 scar tissue    BREAST BIOPSY Bilateral     negative    CAUDAL EPIDURAL STEROID INJECTION N/A 8/19/2022    Procedure: CAUDAL EPIDURAL STEROID INJECTION WITH CATHETER;  Surgeon: Radhika Thurman MD;  Location: [facility];  Service: Pain Management;  Laterality: N/A;    CHOLECYSTECTOMY      COLONOSCOPY  2005    COLONOSCOPY N/A 9/28/2017    Procedure: COLONOSCOPY;  Surgeon: Jeffrey Baptiste MD;  Location: [facility];  Service: Endoscopy;  Laterality: N/A;    EYE SURGERY      cataract bilaterally    HEMIARTHROPLASTY OF HIP Left 4/25/2024    Procedure: HEMIARTHROPLASTY, HIP;  Surgeon: Zac Alcantara MD;  Location: [facility];  Service: Orthopedics;  Laterality: Left;    HYSTERECTOMY  over 30 yr ago    DUB; total    INJECTION OF ANESTHETIC AGENT AROUND MEDIAL BRANCH NERVES INNERVATING LUMBAR FACET JOINT Bilateral 4/30/2021    Procedure: LUMBAR FACET JOINT BLOCK (L3-4,L4-5,L5-S1);  Surgeon: Radhika Thurman MD;  Location: [facility];  Service: Pain Management;  Laterality: Bilateral;    LEFT HEART CATHETERIZATION Left 10/5/2021    Procedure: Left heart cath;  Surgeon: Damian Wood MD;  Location: [facility] C[facility];  Service: Cardiovascular;  Laterality: Left;    OOPHORECTOMY      SPINE SURGERY      TONSILLECTOMY      TRANSFORAMINAL EPIDURAL INJECTION OF STEROID Bilateral 1/8/2021    Procedure: TRANSFORAMINAL EPIDURAL STEROID INJECTION (L5);  Surgeon: Radhika  LEIGH ANN Thurman MD;  Location: [facility];  Service: Pain Management;  Laterality: Bilateral;    TRANSFORAMINAL EPIDURAL INJECTION OF STEROID Bilateral 3/12/2021    Procedure: TRANSFORAMINAL EPIDURAL STEROID INJECTION (L4);  Surgeon: Radhika Thurman MD;  Location: [facility];  Service: Pain Management;  Laterality: Bilateral;       Review of patient's allergies indicates:   Allergen Reactions    Tramadol Other (See Comments)     Dizziness, disoriented       No current facility-administered medications on file prior to encounter.     Current Outpatient Medications on File Prior to Encounter   Medication Sig    acetaminophen (TYLENOL) 500 MG tablet Take 2 tablets by mouth 3 (three) times daily as needed.    albuterol-ipratropium (DUO-NEB) 2.5 mg-0.5 mg/3 mL nebulizer solution Take 1 vial by nebulization every 6 hours as needed for shortness of breath    atorvastatin (LIPITOR) 10 MG tablet Take 1 tablet by mouth every evening.    carvediloL (COREG) 25 MG tablet Take 1 tablet (25 mg total) by mouth Daily. (Patient taking differently: Take 25 mg by mouth every evening.)    gabapentin (NEURONTIN) 600 MG tablet Take 1 tablet (600 mg total) by mouth every evening. (Patient taking differently: Take 600 mg by mouth every morning.)    guaiFENesin (MUCINEX) 600 mg 12 hr tablet Take 1,200 mg by mouth 2 (two) times daily as needed for Congestion.    rivaroxaban (XARELTO) 15 mg Tab Take 1 tablet (15 mg total) by mouth once daily. (Patient taking differently: Take 15 mg by mouth every evening.)    sertraline (ZOLOFT) 50 MG tablet Take 1 tablet by mouth every day    vit C,O-Ck-feojb-lutein-zeaxan (EYE MULTIVIT, LUTEIN-ZEAXAN,) 982-05-59-2-5 mg Cap Take 1 tablet by mouth every evening.     Family History       Problem Relation (Age of Onset)    Breast cancer Cousin, Maternal Aunt, Paternal Aunt    Coronary artery disease Mother    Depression Father    Heart disease Mother, Father    Hyperlipidemia Sister          Tobacco Use    Smoking status:  Never    Smokeless tobacco: Never   Substance and Sexual Activity    Alcohol use: Yes     Alcohol/week: 0.8 standard drinks of alcohol     Types: 1 Standard drinks or equivalent per week     Comment: glass of wine ONCE A WEEK    Drug use: No    Sexual activity: Not Currently     Birth control/protection: Surgical, Post-menopausal, See Surgical Hx     Comment:      Review of Systems   Unable to perform ROS: Dementia   Constitutional:         Much more alert; pleasantly demented     Objective:     Vital Signs (Most Recent):  Temp: 97.8 °F (36.6 °C) (05/25/24 0741)  Pulse: 107 (05/25/24 0741)  Resp: 18 (05/25/24 0741)  BP: (!) 151/67 (05/25/24 0741)  SpO2: (!) 92 % (05/25/24 0741) Vital Signs (24h Range):  Temp:  [97.1 °F (36.2 °C)-99.2 °F (37.3 °C)] 97.8 °F (36.6 °C)  Pulse:  [] 107  Resp:  [18-22] 18  SpO2:  [90 %-100 %] 92 %  BP: (114-156)/(58-85) 151/67     Weight: 67.1 kg (147 lb 14.9 oz)  Body mass index is 23.88 kg/m².     Physical Exam  Vitals and nursing note reviewed.   Constitutional:       General: She is not in acute distress.     Comments: Sleeping, but easily arousable   HENT:      Head: Normocephalic and atraumatic.      Mouth/Throat:      Mouth: Mucous membranes are moist.   Eyes:      Extraocular Movements: Extraocular movements intact.      Conjunctiva/sclera: Conjunctivae normal.      Pupils: Pupils are equal, round, and reactive to light.   Cardiovascular:      Rate and Rhythm: Normal rate and regular rhythm.      Heart sounds: Normal heart sounds. No murmur heard.  Pulmonary:      Effort: Pulmonary effort is normal. No respiratory distress.      Breath sounds: Normal breath sounds.   Abdominal:      General: Bowel sounds are normal. There is no distension.      Palpations: Abdomen is soft.      Tenderness: There is no abdominal tenderness.   Musculoskeletal:      Cervical back: Neck supple.      Right lower leg: No edema.      Left lower leg: No edema.   Skin:     General: Skin is warm  "and dry.   Neurological:      General: No focal deficit present.      Mental Status: Mental status is at baseline. She is disoriented.              CRANIAL NERVES     CN III, IV, VI   Pupils are equal, round, and reactive to light.       Significant Labs: All pertinent labs within the past 24 hours have been reviewed.  Blood Culture:   Recent Labs   Lab 05/24/24  1315 05/24/24  1316   LABBLOO No Growth to date No Growth to date     BMP:   Recent Labs   Lab 05/24/24  1035   *      K 4.4      CO2 23   BUN 13   CREATININE 1.1   CALCIUM 9.0     CBC:   Recent Labs   Lab 05/24/24  1035   WBC 11.35   HGB 11.9*   HCT 37.0        CMP:   Recent Labs   Lab 05/24/24  1035      K 4.4      CO2 23   *   BUN 13   CREATININE 1.1   CALCIUM 9.0   PROT 6.6   ALBUMIN 3.2*   BILITOT 0.9   ALKPHOS 80   AST 22   ALT 10   ANIONGAP 12     Cardiac Markers: No results for input(s): "CKMB", "MYOGLOBIN", "BNP", "TROPISTAT" in the last 48 hours.  Coagulation: No results for input(s): "PT", "INR", "APTT" in the last 48 hours.  Lactic Acid:   Recent Labs   Lab 05/24/24  1316   LACTATE 2.0     Troponin:   Recent Labs   Lab 05/24/24  1035   TROPONINI 0.008     TSH:   Recent Labs   Lab 05/24/24  1035   TSH 1.625     Urine Culture: No results for input(s): "LABURIN" in the last 48 hours.  Urine Studies:   Recent Labs   Lab 05/24/24  1208   COLORU Yellow   APPEARANCEUA Cloudy*   PHUR 8.0   SPECGRAV 1.010   PROTEINUA 3+*   GLUCUA Negative   KETONESU Trace*   BILIRUBINUA 1+*   OCCULTUA 1+*   NITRITE Negative   UROBILINOGEN Negative   LEUKOCYTESUR 3+*   RBCUA 4   WBCUA >100*   BACTERIA Moderate*   HYALINECASTS 0       Significant Imaging: I have reviewed all pertinent imaging results/findings within the past 24 hours.    CXR: No acute process. No significant change.   "

## 2024-05-25 NOTE — ASSESSMENT & PLAN NOTE
Likely 2/2 orthostatic hypotension vs vasovagal  Tele  Adjust carvedilol dose to 12.5mg BIF (previously on 25mg nightly)

## 2024-05-25 NOTE — PLAN OF CARE
Problem: Adult Inpatient Plan of Care  Goal: Plan of Care Review  Outcome: Progressing  Goal: Patient-Specific Goal (Individualized)  Outcome: Progressing  Goal: Absence of Hospital-Acquired Illness or Injury  Outcome: Progressing  Goal: Optimal Comfort and Wellbeing  Outcome: Progressing  Goal: Readiness for Transition of Care  Outcome: Progressing     Problem: Wound  Goal: Optimal Coping  Outcome: Progressing  Goal: Optimal Functional Ability  Outcome: Progressing  Goal: Absence of Infection Signs and Symptoms  Outcome: Progressing  Goal: Improved Oral Intake  Outcome: Progressing  Goal: Optimal Pain Control and Function  Outcome: Progressing  Goal: Skin Health and Integrity  Outcome: Progressing  Goal: Optimal Wound Healing  Outcome: Progressing     Problem: Skin Injury Risk Increased  Goal: Skin Health and Integrity  Outcome: Progressing     Problem: UTI (Urinary Tract Infection)  Goal: Improved Infection Symptoms  Outcome: Progressing     Problem: Functional Deficit  Goal: Improved Balance and Postural Control  Outcome: Progressing  Goal: Optimal Coordination  Outcome: Progressing  Goal: Improved Muscle Strength  Outcome: Progressing  Goal: Optimal Range of Motion  Outcome: Progressing

## 2024-05-25 NOTE — ASSESSMENT & PLAN NOTE
Chronic, controlled. Latest blood pressure and vitals reviewed-     Temp:  [97.1 °F (36.2 °C)-99.2 °F (37.3 °C)]   Pulse:  []   Resp:  [18-22]   BP: (114-156)/(58-85)   SpO2:  [90 %-100 %] .   Home meds for hypertension were reviewed and noted below.   Hypertension Medications               carvediloL (COREG) 25 MG tablet Take 1 tablet (25 mg total) by mouth Daily.            While in the hospital, will manage blood pressure as follows; Adjust home antihypertensive regimen as follows- change carvedilol to 12.5mg BID    Will utilize p.r.n. blood pressure medication only if patient's blood pressure greater than 180/110 and she develops symptoms such as worsening chest pain or shortness of breath.

## 2024-05-26 LAB
ANION GAP SERPL CALC-SCNC: 10 MMOL/L (ref 8–16)
BASOPHILS # BLD AUTO: 0.04 K/UL (ref 0–0.2)
BASOPHILS NFR BLD: 0.7 % (ref 0–1.9)
BUN SERPL-MCNC: 10 MG/DL (ref 10–30)
CALCIUM SERPL-MCNC: 9.3 MG/DL (ref 8.7–10.5)
CHLORIDE SERPL-SCNC: 106 MMOL/L (ref 95–110)
CO2 SERPL-SCNC: 24 MMOL/L (ref 23–29)
CREAT SERPL-MCNC: 0.8 MG/DL (ref 0.5–1.4)
DIFFERENTIAL METHOD BLD: ABNORMAL
EOSINOPHIL # BLD AUTO: 0.2 K/UL (ref 0–0.5)
EOSINOPHIL NFR BLD: 2.9 % (ref 0–8)
ERYTHROCYTE [DISTWIDTH] IN BLOOD BY AUTOMATED COUNT: 14.6 % (ref 11.5–14.5)
EST. GFR  (NO RACE VARIABLE): >60 ML/MIN/1.73 M^2
GLUCOSE SERPL-MCNC: 145 MG/DL (ref 70–110)
HCT VFR BLD AUTO: 32.8 % (ref 37–48.5)
HGB BLD-MCNC: 10.7 G/DL (ref 12–16)
IMM GRANULOCYTES # BLD AUTO: 0.02 K/UL (ref 0–0.04)
IMM GRANULOCYTES NFR BLD AUTO: 0.3 % (ref 0–0.5)
LYMPHOCYTES # BLD AUTO: 1.3 K/UL (ref 1–4.8)
LYMPHOCYTES NFR BLD: 21 % (ref 18–48)
MCH RBC QN AUTO: 29.5 PG (ref 27–31)
MCHC RBC AUTO-ENTMCNC: 32.6 G/DL (ref 32–36)
MCV RBC AUTO: 90 FL (ref 82–98)
MONOCYTES # BLD AUTO: 0.4 K/UL (ref 0.3–1)
MONOCYTES NFR BLD: 6.1 % (ref 4–15)
NEUTROPHILS # BLD AUTO: 4.1 K/UL (ref 1.8–7.7)
NEUTROPHILS NFR BLD: 69 % (ref 38–73)
NRBC BLD-RTO: 0 /100 WBC
PLATELET # BLD AUTO: 235 K/UL (ref 150–450)
PMV BLD AUTO: 9 FL (ref 9.2–12.9)
POTASSIUM SERPL-SCNC: 3.5 MMOL/L (ref 3.5–5.1)
RBC # BLD AUTO: 3.63 M/UL (ref 4–5.4)
SODIUM SERPL-SCNC: 140 MMOL/L (ref 136–145)
WBC # BLD AUTO: 5.94 K/UL (ref 3.9–12.7)

## 2024-05-26 PROCEDURE — 25000003 PHARM REV CODE 250: Performed by: STUDENT IN AN ORGANIZED HEALTH CARE EDUCATION/TRAINING PROGRAM

## 2024-05-26 PROCEDURE — 99900035 HC TECH TIME PER 15 MIN (STAT)

## 2024-05-26 PROCEDURE — 85025 COMPLETE CBC W/AUTO DIFF WBC: CPT | Performed by: STUDENT IN AN ORGANIZED HEALTH CARE EDUCATION/TRAINING PROGRAM

## 2024-05-26 PROCEDURE — 80048 BASIC METABOLIC PNL TOTAL CA: CPT | Performed by: STUDENT IN AN ORGANIZED HEALTH CARE EDUCATION/TRAINING PROGRAM

## 2024-05-26 PROCEDURE — 36415 COLL VENOUS BLD VENIPUNCTURE: CPT | Performed by: STUDENT IN AN ORGANIZED HEALTH CARE EDUCATION/TRAINING PROGRAM

## 2024-05-26 PROCEDURE — 99900031 HC PATIENT EDUCATION (STAT)

## 2024-05-26 PROCEDURE — 63600175 PHARM REV CODE 636 W HCPCS: Performed by: STUDENT IN AN ORGANIZED HEALTH CARE EDUCATION/TRAINING PROGRAM

## 2024-05-26 PROCEDURE — A4216 STERILE WATER/SALINE, 10 ML: HCPCS | Performed by: STUDENT IN AN ORGANIZED HEALTH CARE EDUCATION/TRAINING PROGRAM

## 2024-05-26 PROCEDURE — 99232 SBSQ HOSP IP/OBS MODERATE 35: CPT | Mod: ,,, | Performed by: STUDENT IN AN ORGANIZED HEALTH CARE EDUCATION/TRAINING PROGRAM

## 2024-05-26 PROCEDURE — 94761 N-INVAS EAR/PLS OXIMETRY MLT: CPT

## 2024-05-26 PROCEDURE — 11000001 HC ACUTE MED/SURG PRIVATE ROOM

## 2024-05-26 RX ADMIN — SERTRALINE HYDROCHLORIDE 50 MG: 50 TABLET ORAL at 08:05

## 2024-05-26 RX ADMIN — CARVEDILOL 12.5 MG: 12.5 TABLET, FILM COATED ORAL at 05:05

## 2024-05-26 RX ADMIN — CARVEDILOL 12.5 MG: 12.5 TABLET, FILM COATED ORAL at 08:05

## 2024-05-26 RX ADMIN — Medication 10 ML: at 08:05

## 2024-05-26 RX ADMIN — ATORVASTATIN CALCIUM 10 MG: 10 TABLET, FILM COATED ORAL at 08:05

## 2024-05-26 RX ADMIN — GABAPENTIN 600 MG: 600 TABLET, COATED ORAL at 08:05

## 2024-05-26 RX ADMIN — CEFTRIAXONE SODIUM 1 G: 1 INJECTION, POWDER, FOR SOLUTION INTRAMUSCULAR; INTRAVENOUS at 01:05

## 2024-05-26 RX ADMIN — RIVAROXABAN 15 MG: 15 TABLET, FILM COATED ORAL at 08:05

## 2024-05-26 NOTE — ASSESSMENT & PLAN NOTE
Patient with Long standing persistent (>12 months) atrial fibrillation which is controlled currently with Beta Blocker. Patient is currently in atrial fibrillation.[id number] Score: 3. Anticoagulation indicated. Anticoagulation done with home xarelto .

## 2024-05-26 NOTE — ASSESSMENT & PLAN NOTE
Rocephin day 3  Urine culture proteus, sensitivities pending  Blood culture NGTD  Mental status much improved

## 2024-05-26 NOTE — PROGRESS NOTES
[place] [facility] Surg ([facility])  [facility] Medicine  Progress Note    Patient Name: Caroline Arguello  MRN: [id number]  Patient Class: IP- Inpatient   Admission Date: 5/24/2024  Length of Stay: 1 days  Attending Physician: Mkii Vasques MD  Primary Care Provider: Ev Guevara MD        Subjective:     Principal Problem:Near syncope        HPI:  89 yo F with PMHx of chronic AF on Xarelto, HTN, HLD, depression, dementia, and frequent falls who presented to ED following a syncopal episode at home. Patient is only oriented to self at baseline and requires assistance with all ADLs due to dementia per chart review. On my eval, pt is alone in room. Per ER notes: daughter states that she was sitting there, became lightheaded and fell forward while sitting. Patient did not fall to the ground. She immediately regained consciousness. Denies any chest pain or shortness breath associated with this. No abdominal pain, nausea, vomiting, diarrhea. Patient reports she feels at her baseline. EMS noted a blood pressure of systolic 80, gave 500 mL of fluid prior to arrival. She was found to have UTI in ER.    Overview/Hospital Course:  5/25/24: no acute events overnight. She is much more alert today. Back to her baseline mental status per daughter. Complains of some arthralgias that are chronic. She has no recollection of the events leading to hospitalization. VSS    5/26/24: no acute events overnight. Mental status at baseline. No new complaints. Temp 100.4 yesteraday evening but afebrile sense. Urine culture proteus, awaiting sensitivities. Suspect DC in AM, daughter is not comfortable with DC home today due to fever last night.    Past Medical History:   Diagnosis Date    Acute hypoxic respiratory failure 4/25/2024    Anticoagulant long-term use     Anxiety     Arthritis     Atrial fibrillation     2011- on xarelto    Back pain     S/p discectomy     Depression     Endometriosis, site unspecified     Hyperlipidemia      Hypertension     Hypertensive heart disease with heart failure 2/8/2023    Insomnia     Osteoporosis     Osteoporosis, unspecified     Urinary tract infection, site not specified        Past Surgical History:   Procedure Laterality Date    ADENOIDECTOMY      BACK SURGERY      1980 discectomy and 1989 scar tissue    BREAST BIOPSY Bilateral     negative    CAUDAL EPIDURAL STEROID INJECTION N/A 8/19/2022    Procedure: CAUDAL EPIDURAL STEROID INJECTION WITH CATHETER;  Surgeon: Radhika Thurman MD;  Location: [facility];  Service: Pain Management;  Laterality: N/A;    CHOLECYSTECTOMY      COLONOSCOPY  2005    COLONOSCOPY N/A 9/28/2017    Procedure: COLONOSCOPY;  Surgeon: Jeffrey Baptiste MD;  Location: [facility];  Service: Endoscopy;  Laterality: N/A;    EYE SURGERY      cataract bilaterally    HEMIARTHROPLASTY OF HIP Left 4/25/2024    Procedure: HEMIARTHROPLASTY, HIP;  Surgeon: Zac Alcantara MD;  Location: [facility] OR;  Service: Orthopedics;  Laterality: Left;    HYSTERECTOMY  over 30 yr ago    DUB; total    INJECTION OF ANESTHETIC AGENT AROUND MEDIAL BRANCH NERVES INNERVATING LUMBAR FACET JOINT Bilateral 4/30/2021    Procedure: LUMBAR FACET JOINT BLOCK (L3-4,L4-5,L5-S1);  Surgeon: Radhika Thurman MD;  Location: [facility];  Service: Pain Management;  Laterality: Bilateral;    LEFT HEART CATHETERIZATION Left 10/5/2021    Procedure: Left heart cath;  Surgeon: Damian Wood MD;  Location: [facility] CATH;  Service: Cardiovascular;  Laterality: Left;    OOPHORECTOMY      SPINE SURGERY      TONSILLECTOMY      TRANSFORAMINAL EPIDURAL INJECTION OF STEROID Bilateral 1/8/2021    Procedure: TRANSFORAMINAL EPIDURAL STEROID INJECTION (L5);  Surgeon: Radhika Thurman MD;  Location: [facility];  Service: Pain Management;  Laterality: Bilateral;    TRANSFORAMINAL EPIDURAL INJECTION OF STEROID Bilateral 3/12/2021    Procedure: TRANSFORAMINAL EPIDURAL STEROID INJECTION (L4);  Surgeon: Radhika Thurman MD;  Location: [facility];  Service: Pain Management;   Laterality: Bilateral;       Review of patient's allergies indicates:   Allergen Reactions    Tramadol Other (See Comments)     Dizziness, disoriented       No current facility-administered medications on file prior to encounter.     Current Outpatient Medications on File Prior to Encounter   Medication Sig    acetaminophen (TYLENOL) 500 MG tablet Take 2 tablets by mouth 3 (three) times daily as needed.    albuterol-ipratropium (DUO-NEB) 2.5 mg-0.5 mg/3 mL nebulizer solution Take 1 vial by nebulization every 6 hours as needed for shortness of breath    atorvastatin (LIPITOR) 10 MG tablet Take 1 tablet by mouth every evening.    carvediloL (COREG) 25 MG tablet Take 1 tablet (25 mg total) by mouth Daily. (Patient taking differently: Take 25 mg by mouth every evening.)    gabapentin (NEURONTIN) 600 MG tablet Take 1 tablet (600 mg total) by mouth every evening. (Patient taking differently: Take 600 mg by mouth every morning.)    guaiFENesin (MUCINEX) 600 mg 12 hr tablet Take 1,200 mg by mouth 2 (two) times daily as needed for Congestion.    rivaroxaban (XARELTO) 15 mg Tab Take 1 tablet (15 mg total) by mouth once daily. (Patient taking differently: Take 15 mg by mouth every evening.)    sertraline (ZOLOFT) 50 MG tablet Take 1 tablet by mouth every day    vit C,V-Ua-sdwpr-lutein-zeaxan (EYE MULTIVIT, LUTEIN-ZEAXAN,) 447-99-37-2-5 mg Cap Take 1 tablet by mouth every evening.     Family History       Problem Relation (Age of Onset)    Breast cancer Cousin, Maternal Aunt, Paternal Aunt    Coronary artery disease Mother    Depression Father    Heart disease Mother, Father    Hyperlipidemia Sister          Tobacco Use    Smoking status: Never    Smokeless tobacco: Never   Substance and Sexual Activity    Alcohol use: Yes     Alcohol/week: 0.8 standard drinks of alcohol     Types: 1 Standard drinks or equivalent per week     Comment: glass of wine ONCE A WEEK    Drug use: No    Sexual activity: Not Currently     Birth  control/protection: Surgical, Post-menopausal, See Surgical Hx     Comment:      Review of Systems   Unable to perform ROS: Dementia   Constitutional:         Much more alert; pleasantly demented     Objective:     Vital Signs (Most Recent):  Temp: 98.9 °F (37.2 °C) (05/26/24 1129)  Pulse: 89 (05/26/24 1129)  Resp: 20 (05/26/24 1129)  BP: 122/70 (05/26/24 1129)  SpO2: 95 % (05/26/24 1129) Vital Signs (24h Range):  Temp:  [97.4 °F (36.3 °C)-100.4 °F (38 °C)] 98.9 °F (37.2 °C)  Pulse:  [] 89  Resp:  [18-24] 20  SpO2:  [93 %-97 %] 95 %  BP: (122-178)/(70-88) 122/70     Weight: 67.1 kg (147 lb 14.9 oz)  Body mass index is 23.88 kg/m².     Physical Exam  Vitals and nursing note reviewed.   Constitutional:       General: She is not in acute distress.     Comments: Sleeping, but easily arousable   HENT:      Head: Normocephalic and atraumatic.      Mouth/Throat:      Mouth: Mucous membranes are moist.   Eyes:      Extraocular Movements: Extraocular movements intact.      Conjunctiva/sclera: Conjunctivae normal.      Pupils: Pupils are equal, round, and reactive to light.   Cardiovascular:      Rate and Rhythm: Normal rate and regular rhythm.      Heart sounds: Normal heart sounds. No murmur heard.  Pulmonary:      Effort: Pulmonary effort is normal. No respiratory distress.      Breath sounds: Normal breath sounds.   Abdominal:      General: Bowel sounds are normal. There is no distension.      Palpations: Abdomen is soft.      Tenderness: There is no abdominal tenderness.   Musculoskeletal:      Cervical back: Neck supple.      Right lower leg: No edema.      Left lower leg: No edema.   Skin:     General: Skin is warm and dry.   Neurological:      General: No focal deficit present.      Mental Status: Mental status is at baseline. She is disoriented.              CRANIAL NERVES     CN III, IV, VI   Pupils are equal, round, and reactive to light.       Significant Labs: All pertinent labs within the past 24  "hours have been reviewed.  Blood Culture:   Recent Labs   Lab 05/24/24  1315 05/24/24  1316   LABBLOO No Growth to date  No Growth to date No Growth to date  No Growth to date     BMP:   Recent Labs   Lab 05/26/24  1104   *      K 3.5      CO2 24   BUN 10   CREATININE 0.8   CALCIUM 9.3     CBC:   Recent Labs   Lab 05/26/24  1103   WBC 5.94   HGB 10.7*   HCT 32.8*        CMP:   Recent Labs   Lab 05/25/24  1207 05/26/24  1104    140   K 3.4* 3.5    106   CO2 22* 24   * 145*   BUN 15 10   CREATININE 0.8 0.8   CALCIUM 9.3 9.3   ANIONGAP 13 10     Cardiac Markers: No results for input(s): "CKMB", "MYOGLOBIN", "BNP", "TROPISTAT" in the last 48 hours.  Coagulation: No results for input(s): "PT", "INR", "APTT" in the last 48 hours.  Lactic Acid:   Recent Labs   Lab 05/24/24  1316   LACTATE 2.0     Troponin:   No results for input(s): "TROPONINI", "TROPONINIHS" in the last 48 hours.    TSH:   Recent Labs   Lab 05/24/24  1035   TSH 1.625     Urine Culture:   Recent Labs   Lab 05/24/24  1208   LABURIN PRESUMPTIVE PROTEUS SPECIES  >100,000 cfu/ml  Identification and susceptibility pending  *     Urine Studies:   Recent Labs   Lab 05/24/24  1208   COLORU Yellow   APPEARANCEUA Cloudy*   PHUR 8.0   SPECGRAV 1.010   PROTEINUA 3+*   GLUCUA Negative   KETONESU Trace*   BILIRUBINUA 1+*   OCCULTUA 1+*   NITRITE Negative   UROBILINOGEN Negative   LEUKOCYTESUR 3+*   RBCUA 4   WBCUA >100*   BACTERIA Moderate*   HYALINECASTS 0       Significant Imaging: I have reviewed all pertinent imaging results/findings within the past 24 hours.    CXR: No acute process. No significant change.     Assessment/Plan:      * Near syncope  Likely 2/2 orthostatic hypotension vs vasovagal  Tele  Adjusted carvedilol dose to 12.5mg BID (previously on 25mg nightly)          Acute cystitis without hematuria  Rocephin day 3  Urine culture proteus, sensitivities pending  Blood culture NGTD  Mental status much " improved      Closed fracture of left hip  Weight bearing as tolerated per chart review  PT/OT      Dementia in other diseases classified elsewhere, unspecified severity, with mood disturbance  Patient with dementia with likely etiology of alzheimer's dementia. Dementia is moderate. The patient does not have signs of behavioral disturbance. Continue non-pharmacologic interventions to prevent delirium (No VS between 11PM-5AM, activity during day, opening blinds, providing glasses/hearing aids, and up in chair during daytime). Will avoid narcotics and benzos unless absolutely necessary. PRN anti-psychotics are not prescribed to avoid self harm behaviors.    Chronic kidney disease, stage 3a  Creatine stable for now. BMP reviewed- noted Estimated Creatinine Clearance: 42.9 mL/min (based on SCr of 0.8 mg/dL). according to latest data. Based on current GFR, CKD stage is stage 3 - GFR 30-59.  Monitor UOP and serial BMP and adjust therapy as needed. Renally dose meds. Avoid nephrotoxic medications and procedures.    Chronic radicular pain of lower back  Cont home gabapentin 600mg nightly      Essential hypertension  Chronic, controlled. Latest blood pressure and vitals reviewed-     Temp:  [97.4 °F (36.3 °C)-100.4 °F (38 °C)]   Pulse:  []   Resp:  [18-24]   BP: (122-178)/(70-88)   SpO2:  [93 %-97 %] .   Home meds for hypertension were reviewed and noted below.   Hypertension Medications               carvediloL (COREG) 25 MG tablet Take 1 tablet (25 mg total) by mouth Daily.            While in the hospital, will manage blood pressure as follows; Adjust home antihypertensive regimen as follows- change carvedilol to 12.5mg BID    Will utilize p.r.n. blood pressure medication only if patient's blood pressure greater than 180/110 and she develops symptoms such as worsening chest pain or shortness of breath.    Chronic atrial fibrillation  Patient with Long standing persistent (>12 months) atrial fibrillation which is  controlled currently with Beta Blocker. Patient is currently in atrial fibrillation.[id number] Score: 3. Anticoagulation indicated. Anticoagulation done with home xarelto .    Major depressive disorder, recurrent episode, moderate  Patient has recurrent depression which is mild and is currently controlled. Will Continue anti-depressant medications. We will not consult psychiatry at this time. Patient does not display psychosis at this time. Continue to monitor closely and adjust plan of care as needed.    Cont home sertraline 50mg daily    Hyperlipidemia  Cont home statin        VTE Risk Mitigation (From admission, onward)           Ordered     rivaroxaban tablet 15 mg  Daily         05/24/24 1544     IP VTE HIGH RISK PATIENT  Once         05/24/24 1418     Place sequential compression device  Until discontinued         05/24/24 1418                    Discharge Planning   LETICIA:      Code Status: Full Code   Is the patient medically ready for discharge?:     Reason for patient still in hospital (select all that apply): Patient trending condition, Treatment, and PT / OT recommendations                     Miki Vasques MD  Department of Hospital Medicine   [place] - Med Surg (3rd Fl)

## 2024-05-26 NOTE — PLAN OF CARE
[place] - Med Surg (3rd Fl)  Initial Discharge Assessment       Primary Care Provider: Ev Guevara MD    Admission Diagnosis: Dehydration [E86.0]  Generalized weakness [R53.1]    Admission Date: 5/24/2024  Expected Discharge Date:     Transition of Care Barriers: None    Payor: [employer] / Plan: [employer] / Product Type: Medicare Advantage /     Extended Emergency Contact Information  Primary Emergency Contact: JosChayo  Address: [address] [place] [address] . .           [place] [address] [facility]  Home Phone: [phone]  Mobile Phone: [phone]  Relation: Daughter    Discharge Plan A: Home with family, Home Health  Discharge Plan B: Home with family, Home Health      [facility] [address] [place] [address]  Phone: [phone] Fax: [phone]    [facility] - [facility] AT Portal to Registered [facility]  [place] [address]  Phone: [phone] Fax: [phone]    [facility] - [facility] [address]  [address]  [facility] [address]  Phone: [phone] Fax: [phone]    [facility] - [place], [place] - [address]  [address]  [place] [address]  Phone: [phone] Fax: [phone]      Initial Assessment (most recent)       Adult Discharge Assessment - 05/26/24 1206          Discharge Assessment    Assessment Type Discharge Planning Assessment     Confirmed/corrected address, phone number and insurance Yes     Confirmed Demographics Correct on Facesheet     Source of Information family     If unable to respond/provide information was family/caregiver contacted? Yes     Contact Name/Number Chayo (daughter) [phone]     When was your last doctors appointment? 05/13/24     Communicated LETICIA with patient/caregiver Date not available/Unable to determine     Reason For Admission Near syncope     People in Home  child(esperanza), adult     Do you expect to return to your current living situation? Yes     Do you have help at home or someone to help you manage your care at home? Yes     Who are your caregiver(s) and their phone number(s)? Chayo (daughter) [phone]     Prior to hospitilization cognitive status: Unable to Assess     Current cognitive status: Unable to Assess     Walking or Climbing Stairs Difficulty yes     Walking or Climbing Stairs ambulation difficulty, requires equipment;transferring difficulty, dependent     Mobility Management walker, transport chair     Dressing/Bathing Difficulty yes     Dressing/Bathing bathing difficulty, assistance 1 person;dressing difficulty, assistance 1 person     Home Accessibility wheelchair accessible     Home Layout Able to live on 1st floor     Equipment Currently Used at Home walker, rolling;other (see comments)   transport chair, purewick    Readmission within 30 days? No     Patient currently being followed by outpatient case management? No     Do you currently have service(s) that help you manage your care at home? No     Do you take prescription medications? Yes     Do you have prescription coverage? Yes     Coverage [facility]     Do you have any problems affording any of your prescribed medications? No     Is the patient taking medications as prescribed? yes     Who is going to help you get home at discharge? family     How do you get to doctors appointments? family or friend will provide     Are you on dialysis? No     Do you take coumadin? No     Discharge Plan A Home with family;Home Health     Discharge Plan B Home with family;Home Health     DME Needed Upon Discharge  none     Discharge Plan discussed with: Adult children     Transition of Care Barriers None                   Discharge assessment completed with patient's daughter Chayo.  Patient does have home health services but daughter did not remember name of agency.  SW will remain available for any   needs or concerns.

## 2024-05-26 NOTE — ASSESSMENT & PLAN NOTE
Creatine stable for now. BMP reviewed- noted Estimated Creatinine Clearance: 42.9 mL/min (based on SCr of 0.8 mg/dL). according to latest data. Based on current GFR, CKD stage is stage 3 - GFR 30-59.  Monitor UOP and serial BMP and adjust therapy as needed. Renally dose meds. Avoid nephrotoxic medications and procedures.

## 2024-05-26 NOTE — ASSESSMENT & PLAN NOTE
Likely 2/2 orthostatic hypotension vs vasovagal  Tele  Adjusted carvedilol dose to 12.5mg BID (previously on 25mg nightly)

## 2024-05-26 NOTE — PLAN OF CARE
Problem: Adult Inpatient Plan of Care  Goal: Plan of Care Review  Outcome: Progressing  Goal: Patient-Specific Goal (Individualized)  Outcome: Progressing  Goal: Absence of Hospital-Acquired Illness or Injury  Outcome: Progressing  Goal: Optimal Comfort and Wellbeing  Outcome: Progressing  Goal: Readiness for Transition of Care  Outcome: Progressing     Problem: Wound  Goal: Optimal Coping  Outcome: Progressing  Goal: Optimal Functional Ability  Outcome: Progressing  Goal: Absence of Infection Signs and Symptoms  Outcome: Progressing  Goal: Improved Oral Intake  Outcome: Progressing  Goal: Optimal Pain Control and Function  Outcome: Progressing  Goal: Skin Health and Integrity  Outcome: Progressing  Goal: Optimal Wound Healing  Outcome: Progressing     Problem: Wound  Goal: Optimal Pain Control and Function  Outcome: Progressing     Problem: Skin Injury Risk Increased  Goal: Skin Health and Integrity  Outcome: Progressing     Problem: Fall Injury Risk  Goal: Absence of Fall and Fall-Related Injury  Outcome: Progressing     Problem: UTI (Urinary Tract Infection)  Goal: Improved Infection Symptoms  Outcome: Progressing     Problem: Functional Deficit  Goal: Improved Balance and Postural Control  Outcome: Progressing  Goal: Optimal Coordination  Outcome: Progressing  Goal: Improved Muscle Strength  Outcome: Progressing  Goal: Optimal Range of Motion  Outcome: Progressing      Detail Level: Generalized Detail Level: Zone Detail Level: Simple Detail Level: Detailed

## 2024-05-26 NOTE — PLAN OF CARE
Problem: Adult Inpatient Plan of Care  Goal: Plan of Care Review  Outcome: Progressing  Goal: Patient-Specific Goal (Individualized)  Outcome: Progressing  Goal: Absence of Hospital-Acquired Illness or Injury  Outcome: Progressing  Goal: Optimal Comfort and Wellbeing  Outcome: Progressing  Goal: Readiness for Transition of Care  Outcome: Progressing     Problem: Wound  Goal: Optimal Coping  Outcome: Progressing  Goal: Optimal Functional Ability  Outcome: Progressing  Goal: Absence of Infection Signs and Symptoms  Outcome: Progressing  Goal: Improved Oral Intake  Outcome: Progressing  Goal: Optimal Pain Control and Function  Outcome: Progressing  Goal: Skin Health and Integrity  Outcome: Progressing  Goal: Optimal Wound Healing  Outcome: Progressing     Problem: Skin Injury Risk Increased  Goal: Skin Health and Integrity  Outcome: Progressing     Problem: Fall Injury Risk  Goal: Absence of Fall and Fall-Related Injury  Outcome: Progressing     Problem: UTI (Urinary Tract Infection)  Goal: Improved Infection Symptoms  Outcome: Progressing     Problem: Functional Deficit  Goal: Improved Balance and Postural Control  Outcome: Progressing  Goal: Optimal Coordination  Outcome: Progressing  Goal: Improved Muscle Strength  Outcome: Progressing  Goal: Optimal Range of Motion  Outcome: Progressing

## 2024-05-26 NOTE — ASSESSMENT & PLAN NOTE
Chronic, controlled. Latest blood pressure and vitals reviewed-     Temp:  [97.4 °F (36.3 °C)-100.4 °F (38 °C)]   Pulse:  []   Resp:  [18-24]   BP: (122-178)/(70-88)   SpO2:  [93 %-97 %] .   Home meds for hypertension were reviewed and noted below.   Hypertension Medications               carvediloL (COREG) 25 MG tablet Take 1 tablet (25 mg total) by mouth Daily.            While in the hospital, will manage blood pressure as follows; Adjust home antihypertensive regimen as follows- change carvedilol to 12.5mg BID    Will utilize p.r.n. blood pressure medication only if patient's blood pressure greater than 180/110 and she develops symptoms such as worsening chest pain or shortness of breath.

## 2024-05-26 NOTE — SUBJECTIVE & OBJECTIVE
Past Medical History:   Diagnosis Date    Acute hypoxic respiratory failure 4/25/2024    Anticoagulant long-term use     Anxiety     Arthritis     Atrial fibrillation     2011- on xarelto    Back pain     S/p discectomy     Depression     Endometriosis, site unspecified     Hyperlipidemia     Hypertension     Hypertensive heart disease with heart failure 2/8/2023    Insomnia     Osteoporosis     Osteoporosis, unspecified     Urinary tract infection, site not specified        Past Surgical History:   Procedure Laterality Date    ADENOIDECTOMY      BACK SURGERY      1980 discectomy and 1989 scar tissue    BREAST BIOPSY Bilateral     negative    CAUDAL EPIDURAL STEROID INJECTION N/A 8/19/2022    Procedure: CAUDAL EPIDURAL STEROID INJECTION WITH CATHETER;  Surgeon: Radhika Thurman MD;  Location: [facility];  Service: Pain Management;  Laterality: N/A;    CHOLECYSTECTOMY      COLONOSCOPY  2005    COLONOSCOPY N/A 9/28/2017    Procedure: COLONOSCOPY;  Surgeon: Jeffrey Baptiste MD;  Location: [facility];  Service: Endoscopy;  Laterality: N/A;    EYE SURGERY      cataract bilaterally    HEMIARTHROPLASTY OF HIP Left 4/25/2024    Procedure: HEMIARTHROPLASTY, HIP;  Surgeon: Zac Alcantara MD;  Location: [facility];  Service: Orthopedics;  Laterality: Left;    HYSTERECTOMY  over 30 yr ago    DUB; total    INJECTION OF ANESTHETIC AGENT AROUND MEDIAL BRANCH NERVES INNERVATING LUMBAR FACET JOINT Bilateral 4/30/2021    Procedure: LUMBAR FACET JOINT BLOCK (L3-4,L4-5,L5-S1);  Surgeon: Radhika Thurman MD;  Location: [facility];  Service: Pain Management;  Laterality: Bilateral;    LEFT HEART CATHETERIZATION Left 10/5/2021    Procedure: Left heart cath;  Surgeon: Damian Wood MD;  Location: [facility];  Service: Cardiovascular;  Laterality: Left;    OOPHORECTOMY      SPINE SURGERY      TONSILLECTOMY      TRANSFORAMINAL EPIDURAL INJECTION OF STEROID Bilateral 1/8/2021    Procedure: TRANSFORAMINAL EPIDURAL STEROID INJECTION (L5);  Surgeon: Radhika  LEIGH ANN Thurman MD;  Location: [facility];  Service: Pain Management;  Laterality: Bilateral;    TRANSFORAMINAL EPIDURAL INJECTION OF STEROID Bilateral 3/12/2021    Procedure: TRANSFORAMINAL EPIDURAL STEROID INJECTION (L4);  Surgeon: Radhika Thurman MD;  Location: [facility];  Service: Pain Management;  Laterality: Bilateral;       Review of patient's allergies indicates:   Allergen Reactions    Tramadol Other (See Comments)     Dizziness, disoriented       No current facility-administered medications on file prior to encounter.     Current Outpatient Medications on File Prior to Encounter   Medication Sig    acetaminophen (TYLENOL) 500 MG tablet Take 2 tablets by mouth 3 (three) times daily as needed.    albuterol-ipratropium (DUO-NEB) 2.5 mg-0.5 mg/3 mL nebulizer solution Take 1 vial by nebulization every 6 hours as needed for shortness of breath    atorvastatin (LIPITOR) 10 MG tablet Take 1 tablet by mouth every evening.    carvediloL (COREG) 25 MG tablet Take 1 tablet (25 mg total) by mouth Daily. (Patient taking differently: Take 25 mg by mouth every evening.)    gabapentin (NEURONTIN) 600 MG tablet Take 1 tablet (600 mg total) by mouth every evening. (Patient taking differently: Take 600 mg by mouth every morning.)    guaiFENesin (MUCINEX) 600 mg 12 hr tablet Take 1,200 mg by mouth 2 (two) times daily as needed for Congestion.    rivaroxaban (XARELTO) 15 mg Tab Take 1 tablet (15 mg total) by mouth once daily. (Patient taking differently: Take 15 mg by mouth every evening.)    sertraline (ZOLOFT) 50 MG tablet Take 1 tablet by mouth every day    vit C,M-Hj-pwufg-lutein-zeaxan (EYE MULTIVIT, LUTEIN-ZEAXAN,) 060-18-30-2-5 mg Cap Take 1 tablet by mouth every evening.     Family History       Problem Relation (Age of Onset)    Breast cancer Cousin, Maternal Aunt, Paternal Aunt    Coronary artery disease Mother    Depression Father    Heart disease Mother, Father    Hyperlipidemia Sister          Tobacco Use    Smoking status:  Never    Smokeless tobacco: Never   Substance and Sexual Activity    Alcohol use: Yes     Alcohol/week: 0.8 standard drinks of alcohol     Types: 1 Standard drinks or equivalent per week     Comment: glass of wine ONCE A WEEK    Drug use: No    Sexual activity: Not Currently     Birth control/protection: Surgical, Post-menopausal, See Surgical Hx     Comment:      Review of Systems   Unable to perform ROS: Dementia   Constitutional:         Much more alert; pleasantly demented     Objective:     Vital Signs (Most Recent):  Temp: 98.9 °F (37.2 °C) (05/26/24 1129)  Pulse: 89 (05/26/24 1129)  Resp: 20 (05/26/24 1129)  BP: 122/70 (05/26/24 1129)  SpO2: 95 % (05/26/24 1129) Vital Signs (24h Range):  Temp:  [97.4 °F (36.3 °C)-100.4 °F (38 °C)] 98.9 °F (37.2 °C)  Pulse:  [] 89  Resp:  [18-24] 20  SpO2:  [93 %-97 %] 95 %  BP: (122-178)/(70-88) 122/70     Weight: 67.1 kg (147 lb 14.9 oz)  Body mass index is 23.88 kg/m².     Physical Exam  Vitals and nursing note reviewed.   Constitutional:       General: She is not in acute distress.     Comments: Sleeping, but easily arousable   HENT:      Head: Normocephalic and atraumatic.      Mouth/Throat:      Mouth: Mucous membranes are moist.   Eyes:      Extraocular Movements: Extraocular movements intact.      Conjunctiva/sclera: Conjunctivae normal.      Pupils: Pupils are equal, round, and reactive to light.   Cardiovascular:      Rate and Rhythm: Normal rate and regular rhythm.      Heart sounds: Normal heart sounds. No murmur heard.  Pulmonary:      Effort: Pulmonary effort is normal. No respiratory distress.      Breath sounds: Normal breath sounds.   Abdominal:      General: Bowel sounds are normal. There is no distension.      Palpations: Abdomen is soft.      Tenderness: There is no abdominal tenderness.   Musculoskeletal:      Cervical back: Neck supple.      Right lower leg: No edema.      Left lower leg: No edema.   Skin:     General: Skin is warm and dry.  "  Neurological:      General: No focal deficit present.      Mental Status: Mental status is at baseline. She is disoriented.              CRANIAL NERVES     CN III, IV, VI   Pupils are equal, round, and reactive to light.       Significant Labs: All pertinent labs within the past 24 hours have been reviewed.  Blood Culture:   Recent Labs   Lab 05/24/24  1315 05/24/24  1316   LABBLOO No Growth to date  No Growth to date No Growth to date  No Growth to date     BMP:   Recent Labs   Lab 05/26/24  1104   *      K 3.5      CO2 24   BUN 10   CREATININE 0.8   CALCIUM 9.3     CBC:   Recent Labs   Lab 05/26/24  1103   WBC 5.94   HGB 10.7*   HCT 32.8*        CMP:   Recent Labs   Lab 05/25/24  1207 05/26/24  1104    140   K 3.4* 3.5    106   CO2 22* 24   * 145*   BUN 15 10   CREATININE 0.8 0.8   CALCIUM 9.3 9.3   ANIONGAP 13 10     Cardiac Markers: No results for input(s): "CKMB", "MYOGLOBIN", "BNP", "TROPISTAT" in the last 48 hours.  Coagulation: No results for input(s): "PT", "INR", "APTT" in the last 48 hours.  Lactic Acid:   Recent Labs   Lab 05/24/24  1316   LACTATE 2.0     Troponin:   No results for input(s): "TROPONINI", "TROPONINIHS" in the last 48 hours.    TSH:   Recent Labs   Lab 05/24/24  1035   TSH 1.625     Urine Culture:   Recent Labs   Lab 05/24/24  1208   LABURIN PRESUMPTIVE PROTEUS SPECIES  >100,000 cfu/ml  Identification and susceptibility pending  *     Urine Studies:   Recent Labs   Lab 05/24/24  1208   COLORU Yellow   APPEARANCEUA Cloudy*   PHUR 8.0   SPECGRAV 1.010   PROTEINUA 3+*   GLUCUA Negative   KETONESU Trace*   BILIRUBINUA 1+*   OCCULTUA 1+*   NITRITE Negative   UROBILINOGEN Negative   LEUKOCYTESUR 3+*   RBCUA 4   WBCUA >100*   BACTERIA Moderate*   HYALINECASTS 0       Significant Imaging: I have reviewed all pertinent imaging results/findings within the past 24 hours.    CXR: No acute process. No significant change.   "

## 2024-05-27 VITALS
DIASTOLIC BLOOD PRESSURE: 58 MMHG | HEART RATE: 90 BPM | OXYGEN SATURATION: 97 % | WEIGHT: 147.94 LBS | RESPIRATION RATE: 16 BRPM | HEIGHT: 66 IN | BODY MASS INDEX: 23.77 KG/M2 | TEMPERATURE: 99 F | SYSTOLIC BLOOD PRESSURE: 116 MMHG

## 2024-05-27 LAB
BASOPHILS # BLD AUTO: 0.05 K/UL (ref 0–0.2)
BASOPHILS NFR BLD: 0.9 % (ref 0–1.9)
DIFFERENTIAL METHOD BLD: ABNORMAL
EOSINOPHIL # BLD AUTO: 0.2 K/UL (ref 0–0.5)
EOSINOPHIL NFR BLD: 3.9 % (ref 0–8)
ERYTHROCYTE [DISTWIDTH] IN BLOOD BY AUTOMATED COUNT: 14.6 % (ref 11.5–14.5)
HCT VFR BLD AUTO: 32.8 % (ref 37–48.5)
HGB BLD-MCNC: 10.7 G/DL (ref 12–16)
IMM GRANULOCYTES # BLD AUTO: 0.02 K/UL (ref 0–0.04)
IMM GRANULOCYTES NFR BLD AUTO: 0.4 % (ref 0–0.5)
LYMPHOCYTES # BLD AUTO: 1.5 K/UL (ref 1–4.8)
LYMPHOCYTES NFR BLD: 27.4 % (ref 18–48)
MCH RBC QN AUTO: 29.3 PG (ref 27–31)
MCHC RBC AUTO-ENTMCNC: 32.6 G/DL (ref 32–36)
MCV RBC AUTO: 90 FL (ref 82–98)
MONOCYTES # BLD AUTO: 0.4 K/UL (ref 0.3–1)
MONOCYTES NFR BLD: 6.6 % (ref 4–15)
NEUTROPHILS # BLD AUTO: 3.4 K/UL (ref 1.8–7.7)
NEUTROPHILS NFR BLD: 60.8 % (ref 38–73)
NRBC BLD-RTO: 0 /100 WBC
PLATELET # BLD AUTO: 243 K/UL (ref 150–450)
PMV BLD AUTO: 9 FL (ref 9.2–12.9)
RBC # BLD AUTO: 3.65 M/UL (ref 4–5.4)
WBC # BLD AUTO: 5.63 K/UL (ref 3.9–12.7)

## 2024-05-27 PROCEDURE — 85025 COMPLETE CBC W/AUTO DIFF WBC: CPT | Performed by: STUDENT IN AN ORGANIZED HEALTH CARE EDUCATION/TRAINING PROGRAM

## 2024-05-27 PROCEDURE — 36415 COLL VENOUS BLD VENIPUNCTURE: CPT | Performed by: STUDENT IN AN ORGANIZED HEALTH CARE EDUCATION/TRAINING PROGRAM

## 2024-05-27 PROCEDURE — 99900035 HC TECH TIME PER 15 MIN (STAT)

## 2024-05-27 PROCEDURE — 97530 THERAPEUTIC ACTIVITIES: CPT

## 2024-05-27 PROCEDURE — 25000003 PHARM REV CODE 250: Performed by: STUDENT IN AN ORGANIZED HEALTH CARE EDUCATION/TRAINING PROGRAM

## 2024-05-27 PROCEDURE — 99239 HOSP IP/OBS DSCHRG MGMT >30: CPT | Mod: ,,, | Performed by: INTERNAL MEDICINE

## 2024-05-27 RX ORDER — CIPROFLOXACIN 500 MG/1
500 TABLET ORAL EVERY 12 HOURS
Qty: 20 TABLET | Refills: 0 | Status: SHIPPED | OUTPATIENT
Start: 2024-05-27 | End: 2024-06-06

## 2024-05-27 RX ORDER — CARVEDILOL 12.5 MG/1
12.5 TABLET ORAL 2 TIMES DAILY WITH MEALS
Qty: 180 TABLET | Refills: 0 | Status: SHIPPED | OUTPATIENT
Start: 2024-05-27 | End: 2025-05-27

## 2024-05-27 RX ADMIN — SERTRALINE HYDROCHLORIDE 50 MG: 50 TABLET ORAL at 09:05

## 2024-05-27 RX ADMIN — CARVEDILOL 12.5 MG: 12.5 TABLET, FILM COATED ORAL at 07:05

## 2024-05-27 NOTE — PROGRESS NOTES
[place] [facility] ([facility])  [facility] Medicine  Progress Note    Patient Name: Caroline Arguello  MRN: [id number]  Patient Class: IP- Inpatient   Admission Date: 5/24/2024  Length of Stay: 2 days  Attending Physician: Miki Vasques MD  Primary Care Provider: Ev Guevara MD        Subjective:     Principal Problem:Near syncope        HPI:  89 yo F with PMHx of chronic AF on Xarelto, HTN, HLD, depression, dementia, and frequent falls who presented to ED following a syncopal episode at home. Patient is only oriented to self at baseline and requires assistance with all ADLs due to dementia per chart review. On my eval, pt is alone in room. Per ER notes: daughter states that she was sitting there, became lightheaded and fell forward while sitting. Patient did not fall to the ground. She immediately regained consciousness. Denies any chest pain or shortness breath associated with this. No abdominal pain, nausea, vomiting, diarrhea. Patient reports she feels at her baseline. EMS noted a blood pressure of systolic 80, gave 500 mL of fluid prior to arrival. She was found to have UTI in ER.    Overview/Hospital Course:  5/25/24: no acute events overnight. She is much more alert today. Back to her baseline mental status per daughter. Complains of some arthralgias that are chronic. She has no recollection of the events leading to hospitalization. VSS    5/26/24: no acute events overnight. Mental status at baseline. No new complaints. Temp 100.4 yesteraday evening but afebrile sense. Urine culture proteus, awaiting sensitivities. Suspect DC in AM, daughter is not comfortable with DC home today due to fever last night.    5/27/24  She is afebrile   Her BP is stable.  Her cultures for urine are back     PROTEUS MIRABILIS  >100,000 cfu/ml  P       Urine Culture, Routine      Abnormal   GRAM NEGATIVE PABLO  > 100,000 cfu/ml  Identification and susceptibility pending  P      Resulting Agency [facility]        Susceptibility      Proteus mirabilis     CULTURE, URINE (Preliminary)     Amox/K Clav'ate <=8/4 mcg/mL Sensitive     Amp/Sulbactam <=8/4 mcg/mL Sensitive     Ampicillin <=8 mcg/mL Sensitive     Cefazolin <=2 mcg/mL Sensitive     Cefepime <=2 mcg/mL Sensitive     Ceftriaxone <=1 mcg/mL Sensitive     Ciprofloxacin <=1 mcg/mL Sensitive     Ertapenem <=0.5 mcg/mL Sensitive     Gentamicin <=4 mcg/mL Sensitive     Levofloxacin <=2 mcg/mL Sensitive     Meropenem <=1 mcg/mL Sensitive     Piperacillin/Tazo <=16 mcg/mL Sensitive     Tobramycin <=4 mcg/mL Sensitive     Trimeth/Sulfa <=2/38 mcg/mL Sensitive             I talked to otis her daughter ;discussed plan .  Will dc home on po antibiotics   She has sitters at home   Will order home health and home PT             Review of Systems   Unable to perform ROS: Dementia   Constitutional:         Much more alert; pleasantly demented     Objective:     Vital Signs (Most Recent):  Temp: 98.3 °F (36.8 °C) (05/27/24 0736)  Pulse: 90 (05/27/24 0800)  Resp: 16 (05/27/24 0736)  BP: (!) 141/81 (05/27/24 0736)  SpO2: 96 % (05/27/24 0736) Vital Signs (24h Range):  Temp:  [97.6 °F (36.4 °C)-99 °F (37.2 °C)] 98.3 °F (36.8 °C)  Pulse:  [] 90  Resp:  [16-20] 16  SpO2:  [93 %-96 %] 96 %  BP: (122-146)/(68-97) 141/81     Weight: 67.1 kg (147 lb 14.9 oz)  Body mass index is 23.88 kg/m².    Intake/Output Summary (Last 24 hours) at 5/27/2024 0932  Last data filed at 5/27/2024 0820  Gross per 24 hour   Intake 1545.76 ml   Output 2200 ml   Net -654.24 ml         Physical Exam  Vitals and nursing note reviewed.   Constitutional:       General: She is not in acute distress.     Comments: Working with PT    HENT:      Head: Normocephalic and atraumatic.      Mouth/Throat:      Mouth: Mucous membranes are moist.   Eyes:      Extraocular Movements: Extraocular movements intact.      Conjunctiva/sclera: Conjunctivae normal.      Pupils: Pupils are equal, round, and reactive to light.   Cardiovascular:      Rate  and Rhythm: Normal rate and regular rhythm.      Heart sounds: Normal heart sounds. No murmur heard.  Pulmonary:      Effort: Pulmonary effort is normal. No respiratory distress.      Breath sounds: Normal breath sounds.   Abdominal:      General: Bowel sounds are normal. There is no distension.      Palpations: Abdomen is soft.      Tenderness: There is no abdominal tenderness.   Musculoskeletal:      Cervical back: Neck supple.      Right lower leg: No edema.      Left lower leg: No edema.   Skin:     General: Skin is warm and dry.   Neurological:      General: No focal deficit present.      Mental Status: Mental status is at baseline. She is disoriented.             Significant Labs: All pertinent labs within the past 24 hours have been reviewed.  CBC:   Recent Labs   Lab 05/26/24  1103 05/27/24  0424   WBC 5.94 5.63   HGB 10.7* 10.7*   HCT 32.8* 32.8*    243     CMP:   Recent Labs   Lab 05/25/24  1207 05/26/24  1104    140   K 3.4* 3.5    106   CO2 22* 24   * 145*   BUN 15 10   CREATININE 0.8 0.8   CALCIUM 9.3 9.3   ANIONGAP 13 10           Assessment/Plan:      * Near syncope  Likely 2/2 orthostatic hypotension vs vasovagal  Tele  Adjusted carvedilol dose to 12.5mg BID (previously on 25mg nightly)          Acute cystitis without hematuria  Rocephin day 3  Urine culture proteus, sensitivities pending  Blood culture NGTD  Mental status much improved    5/27/24  PROTEUS MIRABILIS  >100,000 cfu/ml  P       Urine Culture, Routine      Abnormal   GRAM NEGATIVE PABLO  > 100,000 cfu/ml  Identification and susceptibility pending  P      Resulting Agency [facility]        Susceptibility     Proteus mirabilis     CULTURE, URINE (Preliminary)     Amox/K Clav'ate <=8/4 mcg/mL Sensitive     Amp/Sulbactam <=8/4 mcg/mL Sensitive     Ampicillin <=8 mcg/mL Sensitive     Cefazolin <=2 mcg/mL Sensitive     Cefepime <=2 mcg/mL Sensitive     Ceftriaxone <=1 mcg/mL Sensitive     Ciprofloxacin <=1 mcg/mL Sensitive      Ertapenem <=0.5 mcg/mL Sensitive     Gentamicin <=4 mcg/mL Sensitive     Levofloxacin <=2 mcg/mL Sensitive     Meropenem <=1 mcg/mL Sensitive     Piperacillin/Tazo <=16 mcg/mL Sensitive     Tobramycin <=4 mcg/mL Sensitive     Trimeth/Sulfa <=2/38 mcg/mL Sensitive               Home on PO antibiotics    Closed fracture of left hip  Weight bearing as tolerated per chart review  PT/OT      Dementia in other diseases classified elsewhere, unspecified severity, with mood disturbance  Patient with dementia with likely etiology of alzheimer's dementia. Dementia is moderate. The patient does not have signs of behavioral disturbance. Continue non-pharmacologic interventions to prevent delirium (No VS between 11PM-5AM, activity during day, opening blinds, providing glasses/hearing aids, and up in chair during daytime). Will avoid narcotics and benzos unless absolutely necessary. PRN anti-psychotics are not prescribed to avoid self harm behaviors.    Chronic kidney disease, stage 3a  Creatine stable for now. BMP reviewed- noted Estimated Creatinine Clearance: 42.9 mL/min (based on SCr of 0.8 mg/dL). according to latest data. Based on current GFR, CKD stage is stage 3 - GFR 30-59.  Monitor UOP and serial BMP and adjust therapy as needed. Renally dose meds. Avoid nephrotoxic medications and procedures.    Chronic radicular pain of lower back  Cont home gabapentin 600mg nightly      Essential hypertension  Chronic, controlled. Latest blood pressure and vitals reviewed-     Temp:  [97.6 °F (36.4 °C)-99 °F (37.2 °C)]   Pulse:  []   Resp:  [16-20]   BP: (122-146)/(68-97)   SpO2:  [93 %-96 %] .   Home meds for hypertension were reviewed and noted below.   Hypertension Medications               carvediloL (COREG) 25 MG tablet Take 1 tablet (25 mg total) by mouth Daily.            While in the hospital, will manage blood pressure as follows; Adjust home antihypertensive regimen as follows- change carvedilol to 12.5mg  BID    Will utilize p.r.n. blood pressure medication only if patient's blood pressure greater than 180/110 and she develops symptoms such as worsening chest pain or shortness of breath.    Chronic atrial fibrillation  Patient with Long standing persistent (>12 months) atrial fibrillation which is controlled currently with Beta Blocker. Patient is currently in atrial fibrillation.[id number] Score: 3. Anticoagulation indicated. Anticoagulation done with home xarelto .    Major depressive disorder, recurrent episode, moderate  Patient has recurrent depression which is mild and is currently controlled. Will Continue anti-depressant medications. We will not consult psychiatry at this time. Patient does not display psychosis at this time. Continue to monitor closely and adjust plan of care as needed.    Cont home sertraline 50mg daily    Hyperlipidemia  Cont home statin        VTE Risk Mitigation (From admission, onward)           Ordered     rivaroxaban tablet 15 mg  with dinner         05/27/24 0711     IP VTE HIGH RISK PATIENT  Once         05/24/24 1418     Place sequential compression device  Until discontinued         05/24/24 1418                    Discharge Planning   LETICIA:      Code Status: Full Code   Is the patient medically ready for discharge?:     Reason for patient still in hospital (select all that apply): Pending disposition  Discharge Plan A: Home with family, Home Health                  Ev Guevara MD  Department of Hospital Medicine   [facility] - Med Surg (3rd Fl)

## 2024-05-27 NOTE — PT/OT/SLP PROGRESS
Physical Therapy Treatment    Patient Name:  Caroline Arguello   MRN:  [id number]    Recommendations:     Discharge Recommendations: Moderate Intensity Therapy (Recommend 24 hours supervision.)  Discharge Equipment Recommendations: none  Barriers to discharge: Decreased caregiver support    Assessment:     Caroline Arguello is a 89 y.o. female admitted with a medical diagnosis of Near syncope.  She presents with the following impairments/functional limitations: weakness, impaired endurance, impaired self care skills, impaired functional mobility, decreased safety awareness, impaired cognition, decreased ROM. Patient tolerated out of bed activity and gait functions using RW x ~40 feet with minimal/contact guard assistance and cues to keep pt on the tasks. No SOB.     Rehab Prognosis: Fair; patient would benefit from acute skilled PT services to address these deficits and reach maximum level of function.    Recent Surgery: * No surgery found *      Plan:     During this hospitalization, patient to be seen 5 x/week to address the identified rehab impairments via gait training, therapeutic activities, therapeutic exercises and progress toward the following goals:    Plan of Care Expires:  06/06/24    Subjective     Chief Complaint: none   Patient/Family Comments/goals: none stated  Pain/Comfort:  Pain Rating 1: 0/10      Objective:     Communicated with patient prior to session.  Patient found HOB elevated with bed alarm, Other (comments), pressure relief boots, PureWick, telemetry ([employer]ys camera) upon PT entry to room.     General Precautions: Standard, fall  Orthopedic Precautions: N/A  Braces: N/A  Respiratory Status: Room air     Functional Mobility:  Bed Mobility:     Rolling Left:  contact guard assistance and cues  Rolling Right: contact guard assistance and cues  Scooting: contact guard assistance and cues  Supine to Sit: minimum assistance and cues  Sit to Supine: minimum assistance and cues  Transfers:   Sit to  Stand:  minimum assistance  and cues with rolling walker  Gait: minimal/contact guard assistance and cues x ~40 feet using RW,. Dec foot/floor clearance.  Balance: Sitting; Standby Assistance; Standing with RW: minimal/contact guard assistance      AM-PAC 6 CLICK MOBILITY  Turning over in bed (including adjusting bedclothes, sheets and blankets)?: 3  Sitting down on and standing up from a chair with arms (e.g., wheelchair, bedside commode, etc.): 2  Moving from lying on back to sitting on the side of the bed?: 3  Moving to and from a bed to a chair (including a wheelchair)?: 2  Need to walk in hospital room?: 2  Climbing 3-5 steps with a railing?: 2  Basic Mobility Total Score: 14       Treatment & Education:  AROM ex with cues on B LE x 10 reps on each such as AP, HS, abd/add, LAQ, marching at sitting  on edge of the bed; gait trng using RW x 40 feet with min/contact guard A and cues.    Patient left HOB elevated with all lines intact, call button in reach, bed alarm on, nurse  notified, and avasys camera present..    GOALS:   Multidisciplinary Problems       Physical Therapy Goals          Problem: Physical Therapy    Goal Priority Disciplines Outcome Goal Variances Interventions   Physical Therapy Goal     PT, PT/OT Progressing     Description: Patient will increase functional independence with mobility by performin. Supine to sit with Contact Guard Assistance  2. Sit to supine with Contact Guard Assistance  3. Rolling to Left and Right with Contact Guard Assistance.  4. Sit to stand transfer with Minimal Assistance  5. Bed to chair transfer with Minimal Assistance using Rolling Walker  6. Gait  x 50 feet with Minimal Assistance using Rolling Walker.   7. Lower extremity exercise program x10 reps per handout, with independence                         Time Tracking:     PT Received On: 24  PT Start Time: 923     PT Stop Time: 948  PT Total Time (min): 25 min     Billable Minutes: Therapeutic  Activity 25    Treatment Type: Treatment  PT/PTA: PT           05/27/2024

## 2024-05-27 NOTE — SUBJECTIVE & OBJECTIVE
Review of Systems   Unable to perform ROS: Dementia   Constitutional:         Much more alert; pleasantly demented     Objective:     Vital Signs (Most Recent):  Temp: 98.3 °F (36.8 °C) (05/27/24 0736)  Pulse: 90 (05/27/24 0800)  Resp: 16 (05/27/24 0736)  BP: (!) 141/81 (05/27/24 0736)  SpO2: 96 % (05/27/24 0736) Vital Signs (24h Range):  Temp:  [97.6 °F (36.4 °C)-99 °F (37.2 °C)] 98.3 °F (36.8 °C)  Pulse:  [] 90  Resp:  [16-20] 16  SpO2:  [93 %-96 %] 96 %  BP: (122-146)/(68-97) 141/81     Weight: 67.1 kg (147 lb 14.9 oz)  Body mass index is 23.88 kg/m².    Intake/Output Summary (Last 24 hours) at 5/27/2024 0932  Last data filed at 5/27/2024 0820  Gross per 24 hour   Intake 1545.76 ml   Output 2200 ml   Net -654.24 ml         Physical Exam  Vitals and nursing note reviewed.   Constitutional:       General: She is not in acute distress.     Comments: Working with PT    HENT:      Head: Normocephalic and atraumatic.      Mouth/Throat:      Mouth: Mucous membranes are moist.   Eyes:      Extraocular Movements: Extraocular movements intact.      Conjunctiva/sclera: Conjunctivae normal.      Pupils: Pupils are equal, round, and reactive to light.   Cardiovascular:      Rate and Rhythm: Normal rate and regular rhythm.      Heart sounds: Normal heart sounds. No murmur heard.  Pulmonary:      Effort: Pulmonary effort is normal. No respiratory distress.      Breath sounds: Normal breath sounds.   Abdominal:      General: Bowel sounds are normal. There is no distension.      Palpations: Abdomen is soft.      Tenderness: There is no abdominal tenderness.   Musculoskeletal:      Cervical back: Neck supple.      Right lower leg: No edema.      Left lower leg: No edema.   Skin:     General: Skin is warm and dry.   Neurological:      General: No focal deficit present.      Mental Status: Mental status is at baseline. She is disoriented.             Significant Labs: All pertinent labs within the past 24 hours have been  reviewed.  CBC:   Recent Labs   Lab 05/26/24  1103 05/27/24  0424   WBC 5.94 5.63   HGB 10.7* 10.7*   HCT 32.8* 32.8*    243     CMP:   Recent Labs   Lab 05/25/24  1207 05/26/24  1104    140   K 3.4* 3.5    106   CO2 22* 24   * 145*   BUN 15 10   CREATININE 0.8 0.8   CALCIUM 9.3 9.3   ANIONGAP 13 10

## 2024-05-27 NOTE — DISCHARGE SUMMARY
Patient Name: Caroline Arguello  MRN: [id number]  Patient Class: IP- Inpatient     Admission Date: 5/24/2024  Length of Stay: 2 days  Attending Physician: Miki Vasques MD  Primary Care Provider: Ev Guevara MD           Subjective:      Principal Problem:Near syncope           HPI:  89 yo F with PMHx of chronic AF on Xarelto, HTN, HLD, depression, dementia, and frequent falls who presented to ED following a syncopal episode at home. Patient is only oriented to self at baseline and requires assistance with all ADLs due to dementia per chart review. On my eval, pt is alone in room. Per ER notes: daughter states that she was sitting there, became lightheaded and fell forward while sitting. Patient did not fall to the ground. She immediately regained consciousness. Denies any chest pain or shortness breath associated with this. No abdominal pain, nausea, vomiting, diarrhea. Patient reports she feels at her baseline. EMS noted a blood pressure of systolic 80, gave 500 mL of fluid prior to arrival. She was found to have UTI in ER.     Overview/Hospital Course:  5/25/24: no acute events overnight. She is much more alert today. Back to her baseline mental status per daughter. Complains of some arthralgias that are chronic. She has no recollection of the events leading to hospitalization. VSS     5/26/24: no acute events overnight. Mental status at baseline. No new complaints. Temp 100.4 yesteraday evening but afebrile sense. Urine culture proteus, awaiting sensitivities. Suspect DC in AM, daughter is not comfortable with DC home today due to fever last night.     5/27/24  She is afebrile   Her BP is stable.  Her cultures for urine are back      PROTEUS MIRABILIS  >100,000 cfu/ml  P        Urine Culture, Routine        Abnormal   GRAM NEGATIVE PABLO  > 100,000 cfu/ml  Identification and susceptibility pending  P      Resulting Agency [facility]         Susceptibility              Proteus mirabilis       CULTURE, URINE  (Preliminary)       Amox/K Clav'ate <=8/4 mcg/mL Sensitive       Amp/Sulbactam <=8/4 mcg/mL Sensitive       Ampicillin <=8 mcg/mL Sensitive       Cefazolin <=2 mcg/mL Sensitive       Cefepime <=2 mcg/mL Sensitive       Ceftriaxone <=1 mcg/mL Sensitive       Ciprofloxacin <=1 mcg/mL Sensitive       Ertapenem <=0.5 mcg/mL Sensitive       Gentamicin <=4 mcg/mL Sensitive       Levofloxacin <=2 mcg/mL Sensitive       Meropenem <=1 mcg/mL Sensitive       Piperacillin/Tazo <=16 mcg/mL Sensitive       Tobramycin <=4 mcg/mL Sensitive       Trimeth/Sulfa <=2/38 mcg/mL Sensitive              I talked to otis her daughter ;discussed plan .  Will dc home on po antibiotics   She has sitters at home   Will order home health and home PT                  Review of Systems   Unable to perform ROS: Dementia   Constitutional:         Much more alert; pleasantly demented      Objective:      Vital Signs (Most Recent):  Temp: 98.3 °F (36.8 °C) (05/27/24 0736)  Pulse: 90 (05/27/24 0800)  Resp: 16 (05/27/24 0736)  BP: (!) 141/81 (05/27/24 0736)  SpO2: 96 % (05/27/24 0736) Vital Signs (24h Range):  Temp:  [97.6 °F (36.4 °C)-99 °F (37.2 °C)] 98.3 °F (36.8 °C)  Pulse:  [] 90  Resp:  [16-20] 16  SpO2:  [93 %-96 %] 96 %  BP: (122-146)/(68-97) 141/81      Weight: 67.1 kg (147 lb 14.9 oz)  Body mass index is 23.88 kg/m².     Intake/Output Summary (Last 24 hours) at 5/27/2024 0932  Last data filed at 5/27/2024 0820      Gross per 24 hour   Intake 1545.76 ml   Output 2200 ml   Net -654.24 ml         Physical Exam  Vitals and nursing note reviewed.   Constitutional:       General: She is not in acute distress.     Comments: Working with PT    HENT:      Head: Normocephalic and atraumatic.      Mouth/Throat:      Mouth: Mucous membranes are moist.   Eyes:      Extraocular Movements: Extraocular movements intact.      Conjunctiva/sclera: Conjunctivae normal.      Pupils: Pupils are equal, round, and reactive to light.   Cardiovascular:       Rate and Rhythm: Normal rate and regular rhythm.      Heart sounds: Normal heart sounds. No murmur heard.  Pulmonary:      Effort: Pulmonary effort is normal. No respiratory distress.      Breath sounds: Normal breath sounds.   Abdominal:      General: Bowel sounds are normal. There is no distension.      Palpations: Abdomen is soft.      Tenderness: There is no abdominal tenderness.   Musculoskeletal:      Cervical back: Neck supple.      Right lower leg: No edema.      Left lower leg: No edema.   Skin:     General: Skin is warm and dry.   Neurological:      General: No focal deficit present.      Mental Status: Mental status is at baseline. She is disoriented.                Significant Labs: All pertinent labs within the past 24 hours have been reviewed.  CBC:        Recent Labs   Lab 05/26/24  1103 05/27/24  0424   WBC 5.94 5.63   HGB 10.7* 10.7*   HCT 32.8* 32.8*    243      CMP:        Recent Labs   Lab 05/25/24  1207 05/26/24  1104    140   K 3.4* 3.5    106   CO2 22* 24   * 145*   BUN 15 10   CREATININE 0.8 0.8   CALCIUM 9.3 9.3   ANIONGAP 13 10               Assessment/Plan:      * Near syncope  Likely 2/2 orthostatic hypotension vs vasovagal  Tele  Adjusted carvedilol dose to 12.5mg BID (previously on 25mg nightly)              Acute cystitis without hematuria  Rocephin day 3  Urine culture proteus, sensitivities pending  Blood culture NGTD  Mental status much improved     5/27/24  PROTEUS MIRABILIS  >100,000 cfu/ml  P        Urine Culture, Routine        Abnormal   GRAM NEGATIVE PABLO  > 100,000 cfu/ml  Identification and susceptibility pending  P      Resulting Agency [facility]         Susceptibility              Proteus mirabilis       CULTURE, URINE (Preliminary)       Amox/K Clav'ate <=8/4 mcg/mL Sensitive       Amp/Sulbactam <=8/4 mcg/mL Sensitive       Ampicillin <=8 mcg/mL Sensitive       Cefazolin <=2 mcg/mL Sensitive       Cefepime <=2 mcg/mL Sensitive       Ceftriaxone <=1  mcg/mL Sensitive       Ciprofloxacin <=1 mcg/mL Sensitive       Ertapenem <=0.5 mcg/mL Sensitive       Gentamicin <=4 mcg/mL Sensitive       Levofloxacin <=2 mcg/mL Sensitive       Meropenem <=1 mcg/mL Sensitive       Piperacillin/Tazo <=16 mcg/mL Sensitive       Tobramycin <=4 mcg/mL Sensitive       Trimeth/Sulfa <=2/38 mcg/mL Sensitive                 Home on PO antibiotics     Closed fracture of left hip  Weight bearing as tolerated per chart review  PT/OT        Dementia in other diseases classified elsewhere, unspecified severity, with mood disturbance  Patient with dementia with likely etiology of alzheimer's dementia. Dementia is moderate. The patient does not have signs of behavioral disturbance. Continue non-pharmacologic interventions to prevent delirium (No VS between 11PM-5AM, activity during day, opening blinds, providing glasses/hearing aids, and up in chair during daytime). Will avoid narcotics and benzos unless absolutely necessary. PRN anti-psychotics are not prescribed to avoid self harm behaviors.     Chronic kidney disease, stage 3a  Creatine stable for now. BMP reviewed- noted Estimated Creatinine Clearance: 42.9 mL/min (based on SCr of 0.8 mg/dL). according to latest data. Based on current GFR, CKD stage is stage 3 - GFR 30-59.  Monitor UOP and serial BMP and adjust therapy as needed. Renally dose meds. Avoid nephrotoxic medications and procedures.     Chronic radicular pain of lower back  Cont home gabapentin 600mg nightly        Essential hypertension  Chronic, controlled. Latest blood pressure and vitals reviewed-      Temp:  [97.6 °F (36.4 °C)-99 °F (37.2 °C)]   Pulse:  []   Resp:  [16-20]   BP: (122-146)/(68-97)   SpO2:  [93 %-96 %] .   Home meds for hypertension were reviewed and noted below.   Hypertension Medications                    carvediloL (COREG) 25 MG tablet Take 1 tablet (25 mg total) by mouth Daily.                While in the hospital, will manage blood pressure as  follows; Adjust home antihypertensive regimen as follows- change carvedilol to 12.5mg BID     Will utilize p.r.n. blood pressure medication only if patient's blood pressure greater than 180/110 and she develops symptoms such as worsening chest pain or shortness of breath.     Chronic atrial fibrillation  Patient with Long standing persistent (>12 months) atrial fibrillation which is controlled currently with Beta Blocker. Patient is currently in atrial fibrillation.[id number] Score: 3. Anticoagulation indicated. Anticoagulation done with home xarelto .     Major depressive disorder, recurrent episode, moderate  Patient has recurrent depression which is mild and is currently controlled. Will Continue anti-depressant medications. We will not consult psychiatry at this time. Patient does not display psychosis at this time. Continue to monitor closely and adjust plan of care as needed.     Cont home sertraline 50mg daily     Hyperlipidemia  Cont home statin           VTE Risk Mitigation (From admission, onward)              Ordered       rivaroxaban tablet 15 mg  with dinner         05/27/24 0711       IP VTE HIGH RISK PATIENT  Once         05/24/24 1418       Place sequential compression device  Until discontinued         05/24/24 1418                          Discharge Planning   LETICIA:      Code Status: Full Code   Is the patient medically ready for discharge?:     Reason for patient still in hospital (select all that apply): Pending disposition  Discharge Plan A: Home with family, Home Health                     Ev Guevara MD  Department of Hospital Medicine   [place] - Med Surg (3rd Fl)

## 2024-05-27 NOTE — ASSESSMENT & PLAN NOTE
Patient with Long standing persistent (>12 months) atrial fibrillation which is controlled currently with Beta Blocker. Patient is currently in atrial fibrillation.[id number] Score: 3. Anticoagulation indicated. Anticoagulation done with home xarelto .

## 2024-05-27 NOTE — ASSESSMENT & PLAN NOTE
Rocephin day 3  Urine culture proteus, sensitivities pending  Blood culture NGTD  Mental status much improved    5/27/24  PROTEUS MIRABILIS  >100,000 cfu/ml  P       Urine Culture, Routine      Abnormal   GRAM NEGATIVE PABLO  > 100,000 cfu/ml  Identification and susceptibility pending  P      Resulting Agency [facility]        Susceptibility     Proteus mirabilis     CULTURE, URINE (Preliminary)     Amox/K Clav'ate <=8/4 mcg/mL Sensitive     Amp/Sulbactam <=8/4 mcg/mL Sensitive     Ampicillin <=8 mcg/mL Sensitive     Cefazolin <=2 mcg/mL Sensitive     Cefepime <=2 mcg/mL Sensitive     Ceftriaxone <=1 mcg/mL Sensitive     Ciprofloxacin <=1 mcg/mL Sensitive     Ertapenem <=0.5 mcg/mL Sensitive     Gentamicin <=4 mcg/mL Sensitive     Levofloxacin <=2 mcg/mL Sensitive     Meropenem <=1 mcg/mL Sensitive     Piperacillin/Tazo <=16 mcg/mL Sensitive     Tobramycin <=4 mcg/mL Sensitive     Trimeth/Sulfa <=2/38 mcg/mL Sensitive               Home on PO antibiotics

## 2024-05-27 NOTE — ASSESSMENT & PLAN NOTE
Chronic, controlled. Latest blood pressure and vitals reviewed-     Temp:  [97.6 °F (36.4 °C)-99 °F (37.2 °C)]   Pulse:  []   Resp:  [16-20]   BP: (122-146)/(68-97)   SpO2:  [93 %-96 %] .   Home meds for hypertension were reviewed and noted below.   Hypertension Medications               carvediloL (COREG) 25 MG tablet Take 1 tablet (25 mg total) by mouth Daily.            While in the hospital, will manage blood pressure as follows; Adjust home antihypertensive regimen as follows- change carvedilol to 12.5mg BID    Will utilize p.r.n. blood pressure medication only if patient's blood pressure greater than 180/110 and she develops symptoms such as worsening chest pain or shortness of breath.

## 2024-05-27 NOTE — PLAN OF CARE
[place] - Med Surg (3rd Fl)  Discharge Final Note    Primary Care Provider: Ev Guevara MD    Expected Discharge Date: 5/27/2024    Final Discharge Note (most recent)       Final Note - 05/27/24 1353          Final Note    Assessment Type Final Discharge Note (P)      Anticipated Discharge Disposition Home-Health Care Svc (P)      Hospital Resources/Appts/Education Provided Provided education on problems/symptoms using teachback;Appointments scheduled and added to AVS (P)                      Important Message from Medicare  Important Message from Medicare regarding Discharge Appeal Rights: Given to patient/caregiver, Explained to patient/caregiver, Signed/date by patient/caregiver     Date IMM was signed: 05/25/24  Time IMM was signed: 1225    Contact Info       Ev Guevara MD   Specialty: Internal Medicine   Relationship: PCP - General    [address] [address]   Phone: [phone]       Next Steps: Follow up on 6/3/2024    Instructions: @ 4:15 PM          Daughter VU of verbal instructions provided for follow up care.

## 2024-05-27 NOTE — PLAN OF CARE
Problem: Physical Therapy  Goal: Physical Therapy Goal  Description: Patient will increase functional independence with mobility by performin. Supine to sit with Contact Guard Assistance  2. Sit to supine with Contact Guard Assistance  3. Rolling to Left and Right with Contact Guard Assistance.  4. Sit to stand transfer with Minimal Assistance  5. Bed to chair transfer with Minimal Assistance using Rolling Walker  6. Gait  x 50 feet with Minimal Assistance using Rolling Walker.   7. Lower extremity exercise program x10 reps per handout, with independence    Outcome: Progressing

## 2024-05-28 LAB
BACTERIA UR CULT: ABNORMAL
BACTERIA UR CULT: ABNORMAL

## 2024-05-29 ENCOUNTER — PATIENT OUTREACH (OUTPATIENT)
Dept: ADMINISTRATIVE | Facility: CLINIC | Age: 89
End: 2024-05-29
Payer: MEDICARE

## 2024-05-29 LAB
BACTERIA BLD CULT: NORMAL
BACTERIA BLD CULT: NORMAL

## 2024-05-29 NOTE — PROGRESS NOTES
C3 nurse spoke with Caroline Arguello's daughter, Chayo for a TCC post hospital discharge follow up call. The patient has a scheduled [facility] appointment with Ev Guevara MD on 06/03/24 @ 6348.

## 2024-05-29 NOTE — TELEPHONE ENCOUNTER
Chayo (daughter)    50+ Multivitamin 1 tablet daily    [facility]. Advised of patient d/c date. Left tcc nurse and Chayo's callback information.

## 2024-05-29 NOTE — PT/OT/SLP DISCHARGE
Physical Therapy Discharge Summary    Name: Caroline Arguello  MRN: [id number]   Principal Problem: Near syncope     Patient Discharged from acute Physical Therapy on 24.  Please refer to prior PT noted date on 24 for functional status.     Assessment:     Patient appropriate for care in another setting.    Objective:     GOALS:   Multidisciplinary Problems       Physical Therapy Goals          Problem: Physical Therapy    Goal Priority Disciplines Outcome Goal Variances Interventions   Physical Therapy Goal     PT, PT/OT Progressing     Description: Patient will increase functional independence with mobility by performin. Supine to sit with Contact Guard Assistance  2. Sit to supine with Contact Guard Assistance  3. Rolling to Left and Right with Contact Guard Assistance.  4. Sit to stand transfer with Minimal Assistance  5. Bed to chair transfer with Minimal Assistance using Rolling Walker  6. Gait  x 50 feet with Minimal Assistance using Rolling Walker.   7. Lower extremity exercise program x10 reps per handout, with independence                         Reasons for Discontinuation of Therapy Services  Transfer to alternate level of care.      Plan:     Patient Discharged to: Home with Home Health Service.      2024

## 2024-05-31 PROCEDURE — G0179 MD RECERTIFICATION HHA PT: HCPCS | Mod: ,,, | Performed by: INTERNAL MEDICINE

## 2024-06-03 ENCOUNTER — TELEPHONE (OUTPATIENT)
Dept: INTERNAL MEDICINE | Facility: CLINIC | Age: 89
End: 2024-06-03

## 2024-06-03 ENCOUNTER — OFFICE VISIT (OUTPATIENT)
Dept: INTERNAL MEDICINE | Facility: CLINIC | Age: 89
End: 2024-06-03
Payer: MEDICARE

## 2024-06-03 DIAGNOSIS — A49.8 PROTEUS INFECTION: ICD-10-CM

## 2024-06-03 DIAGNOSIS — Z09 HOSPITAL DISCHARGE FOLLOW-UP: Primary | ICD-10-CM

## 2024-06-03 PROCEDURE — 1160F RVW MEDS BY RX/DR IN RCRD: CPT | Mod: CPTII,95,, | Performed by: INTERNAL MEDICINE

## 2024-06-03 PROCEDURE — 99495 TRANSJ CARE MGMT MOD F2F 14D: CPT | Mod: 95,,, | Performed by: INTERNAL MEDICINE

## 2024-06-03 PROCEDURE — 1159F MED LIST DOCD IN RCRD: CPT | Mod: CPTII,95,, | Performed by: INTERNAL MEDICINE

## 2024-06-03 PROCEDURE — 1111F DSCHRG MED/CURRENT MED MERGE: CPT | Mod: CPTII,95,, | Performed by: INTERNAL MEDICINE

## 2024-06-03 RX ORDER — MUPIROCIN 20 MG/G
OINTMENT TOPICAL 3 TIMES DAILY
Qty: 30 G | Refills: 1 | Status: SHIPPED | OUTPATIENT
Start: 2024-06-03

## 2024-06-03 NOTE — PROGRESS NOTES
HPI:  Caroline Arguello is a 89 y.o. female here for No chief complaint on file.  .  Patient Name: Caroline Arguello  MRN: [id number]  Patient Class: IP- Inpatient     Admission Date: 5/24/2024  Length of Stay: 2 days  Attending Physician: Miki Vasques MD  Primary Care Provider: Ev Guevara MD           Subjective:      Principal Problem:Near syncope           HPI:  89 yo F with PMHx of chronic AF on Xarelto, HTN, HLD, depression, dementia, and frequent falls who presented to ED following a syncopal episode at home. Patient is only oriented to self at baseline and requires assistance with all ADLs due to dementia per chart review. On my eval, pt is alone in room. Per ER notes: daughter states that she was sitting there, became lightheaded and fell forward while sitting. Patient did not fall to the ground. She immediately regained consciousness. Denies any chest pain or shortness breath associated with this. No abdominal pain, nausea, vomiting, diarrhea. Patient reports she feels at her baseline. EMS noted a blood pressure of systolic 80, gave 500 mL of fluid prior to arrival. She was found to have UTI in ER.     Overview/Hospital Course:  5/25/24: no acute events overnight. She is much more alert today. Back to her baseline mental status per daughter. Complains of some arthralgias that are chronic. She has no recollection of the events leading to hospitalization. VSS     5/26/24: no acute events overnight. Mental status at baseline. No new complaints. Temp 100.4 yesteraday evening but afebrile sense. Urine culture proteus, awaiting sensitivities. Suspect DC in AM, daughter is not comfortable with DC home today due to fever last night.     5/27/24  She is afebrile   Her BP is stable.  Her cultures for urine are back      PROTEUS MIRABILIS  >100,000 cfu/ml  P        Urine Culture, Routine        Abnormal   GRAM NEGATIVE PABLO  > 100,000 cfu/ml  Identification and susceptibility pending  P      Resulting Agency  [facility]         Susceptibility                   Proteus mirabilis       CULTURE, URINE (Preliminary)       Amox/K Clav'ate <=8/4 mcg/mL Sensitive       Amp/Sulbactam <=8/4 mcg/mL Sensitive       Ampicillin <=8 mcg/mL Sensitive       Cefazolin <=2 mcg/mL Sensitive       Cefepime <=2 mcg/mL Sensitive       Ceftriaxone <=1 mcg/mL Sensitive       Ciprofloxacin <=1 mcg/mL Sensitive       Ertapenem <=0.5 mcg/mL Sensitive       Gentamicin <=4 mcg/mL Sensitive       Levofloxacin <=2 mcg/mL Sensitive       Meropenem <=1 mcg/mL Sensitive       Piperacillin/Tazo <=16 mcg/mL Sensitive       Tobramycin <=4 mcg/mL Sensitive       Trimeth/Sulfa <=2/38 mcg/mL Sensitive              I talked to otis her daughter ;discussed plan .  Will dc home on po antibiotics   She has sitters at home   Will order home health and home PT                  Review of Systems   Unable to perform ROS: Dementia   Constitutional:         Much more alert; pleasantly demented      Objective:      Vital Signs (Most Recent):  Temp: 98.3 °F (36.8 °C) (05/27/24 0736)  Pulse: 90 (05/27/24 0800)  Resp: 16 (05/27/24 0736)  BP: (!) 141/81 (05/27/24 0736)  SpO2: 96 % (05/27/24 0736) Vital Signs (24h Range):  Temp:  [97.6 °F (36.4 °C)-99 °F (37.2 °C)] 98.3 °F (36.8 °C)  Pulse:  [] 90  Resp:  [16-20] 16  SpO2:  [93 %-96 %] 96 %  BP: (122-146)/(68-97) 141/81      Weight: 67.1 kg (147 lb 14.9 oz)  Body mass index is 23.88 kg/m².     Intake/Output Summary (Last 24 hours) at 5/27/2024 0932  Last data filed at 5/27/2024 0820        Gross per 24 hour   Intake 1545.76 ml   Output 2200 ml   Net -654.24 ml         Physical Exam  Vitals and nursing note reviewed.   Constitutional:       General: She is not in acute distress.     Comments: Working with PT    HENT:      Head: Normocephalic and atraumatic.      Mouth/Throat:      Mouth: Mucous membranes are moist.   Eyes:      Extraocular Movements: Extraocular movements intact.      Conjunctiva/sclera: Conjunctivae normal.       Pupils: Pupils are equal, round, and reactive to light.   Cardiovascular:      Rate and Rhythm: Normal rate and regular rhythm.      Heart sounds: Normal heart sounds. No murmur heard.  Pulmonary:      Effort: Pulmonary effort is normal. No respiratory distress.      Breath sounds: Normal breath sounds.   Abdominal:      General: Bowel sounds are normal. There is no distension.      Palpations: Abdomen is soft.      Tenderness: There is no abdominal tenderness.   Musculoskeletal:      Cervical back: Neck supple.      Right lower leg: No edema.      Left lower leg: No edema.   Skin:     General: Skin is warm and dry.   Neurological:      General: No focal deficit present.      Mental Status: Mental status is at baseline. She is disoriented.                Significant Labs: All pertinent labs within the past 24 hours have been reviewed.  CBC:           Recent Labs   Lab 05/26/24  1103 05/27/24  0424   WBC 5.94 5.63   HGB 10.7* 10.7*   HCT 32.8* 32.8*    243      CMP:           Recent Labs   Lab 05/25/24  1207 05/26/24  1104    140   K 3.4* 3.5    106   CO2 22* 24   * 145*   BUN 15 10   CREATININE 0.8 0.8   CALCIUM 9.3 9.3   ANIONGAP 13 10               Assessment/Plan:      * Near syncope  Likely 2/2 orthostatic hypotension vs vasovagal  Tele  Adjusted carvedilol dose to 12.5mg BID (previously on 25mg nightly)              Acute cystitis without hematuria  Rocephin day 3  Urine culture proteus, sensitivities pending  Blood culture NGTD  Mental status much improved     5/27/24  PROTEUS MIRABILIS  >100,000 cfu/ml  P        Urine Culture, Routine        Abnormal   GRAM NEGATIVE PABLO  > 100,000 cfu/ml  Identification and susceptibility pending  P      Resulting Agency [facility]         Susceptibility                   Proteus mirabilis       CULTURE, URINE (Preliminary)       Amox/K Clav'ate <=8/4 mcg/mL Sensitive       Amp/Sulbactam <=8/4 mcg/mL Sensitive       Ampicillin <=8 mcg/mL Sensitive        Cefazolin <=2 mcg/mL Sensitive       Cefepime <=2 mcg/mL Sensitive       Ceftriaxone <=1 mcg/mL Sensitive       Ciprofloxacin <=1 mcg/mL Sensitive       Ertapenem <=0.5 mcg/mL Sensitive       Gentamicin <=4 mcg/mL Sensitive       Levofloxacin <=2 mcg/mL Sensitive       Meropenem <=1 mcg/mL Sensitive       Piperacillin/Tazo <=16 mcg/mL Sensitive       Tobramycin <=4 mcg/mL Sensitive       Trimeth/Sulfa <=2/38 mcg/mL Sensitive                 Home on PO antibiotics     Closed fracture of left hip  Weight bearing as tolerated per chart review  PT/OT        Dementia in other diseases classified elsewhere, unspecified severity, with mood disturbance  Patient with dementia with likely etiology of alzheimer's dementia. Dementia is moderate. The patient does not have signs of behavioral disturbance. Continue non-pharmacologic interventions to prevent delirium (No VS between 11PM-5AM, activity during day, opening blinds, providing glasses/hearing aids, and up in chair during daytime). Will avoid narcotics and benzos unless absolutely necessary. PRN anti-psychotics are not prescribed to avoid self harm behaviors.     Chronic kidney disease, stage 3a  Creatine stable for now. BMP reviewed- noted Estimated Creatinine Clearance: 42.9 mL/min (based on SCr of 0.8 mg/dL). according to latest data. Based on current GFR, CKD stage is stage 3 - GFR 30-59.  Monitor UOP and serial BMP and adjust therapy as needed. Renally dose meds. Avoid nephrotoxic medications and procedures.     Chronic radicular pain of lower back  Cont home gabapentin 600mg nightly        Essential hypertension  Chronic, controlled. Latest blood pressure and vitals reviewed-      Temp:  [97.6 °F (36.4 °C)-99 °F (37.2 °C)]   Pulse:  []   Resp:  [16-20]   BP: (122-146)/(68-97)   SpO2:  [93 %-96 %] .   Home meds for hypertension were reviewed and noted below.   Hypertension Medications                    carvediloL (COREG) 25 MG tablet Take 1 tablet (25 mg  total) by mouth Daily.                While in the hospital, will manage blood pressure as follows; Adjust home antihypertensive regimen as follows- change carvedilol to 12.5mg BID     Will utilize p.r.n. blood pressure medication only if patient's blood pressure greater than 180/110 and she develops symptoms such as worsening chest pain or shortness of breath.     Chronic atrial fibrillation  Patient with Long standing persistent (>12 months) atrial fibrillation which is controlled currently with Beta Blocker. Patient is currently in atrial fibrillation.[id number] Score: 3. Anticoagulation indicated. Anticoagulation done with home xarelto .     Major depressive disorder, recurrent episode, moderate  Patient has recurrent depression which is mild and is currently controlled. Will Continue anti-depressant medications. We will not consult psychiatry at this time. Patient does not display psychosis at this time. Continue to monitor closely and adjust plan of care as needed.     Cont home sertraline 50mg daily     Hyperlipidemia  Cont home statin           VTE Risk Mitigation (From admission, onward)              Ordered       rivaroxaban tablet 15 mg  with dinner         05/27/24 0711       IP VTE HIGH RISK PATIENT  Once         05/24/24 1418       Place sequential compression device  Until discontinued         05/24/24 1418                          Discharge Planning   LETICIA:      Code Status: Full Code   Is the patient medically ready for discharge?:     Reason for patient still in hospital (select all that apply): Pending disposition  Discharge Plan A: Home with family, Home Health                     Ev Guevara MD  Department of Hospital Medicine   [place] [facility] ([facility])    6/3/24  Virtual visit ; 15 minutes     She is getting home miladis and PT .  She is taking her antibiotics   Heel wound discussed .  Scabbed hip area .      The patient location is: home  The chief complaint leading to consultation is:  hospital discharge    Visit type: audiovisual    Face to Face time with patient caregiver : 15   15 minutes of total time spent on the encounter, which includes face to face time and non-face to face time preparing to see the patient (eg, review of tests), Obtaining and/or reviewing separately obtained history, Documenting clinical information in the electronic or other health record, Independently interpreting results (not separately reported) and communicating results to the patient/family/caregiver, or Care coordination (not separately reported).         Each patient to whom he or she provides medical services by telemedicine is:  (1) informed of the relationship between the physician and patient and the respective role of any other health care provider with respect to management of the patient; and (2) notified that he or she may decline to receive medical services by telemedicine and may withdraw from such care at any time.      Assessment/Plan:  1. Hospital discharge follow-up    2. Proteus infection    Other orders  -     mupirocin (BACTROBAN) 2 % ointment; Apply topically 3 (three) times daily.  Dispense: 30 g; Refill: 1       Answers submitted by the patient for this visit:  Review of Systems Questionnaire (Submitted on 6/3/2024)  activity change: Yes  unexpected weight change: Yes  neck pain: No  hearing loss: Yes  rhinorrhea: No  trouble swallowing: No  eye discharge: No  visual disturbance: No  chest tightness: No  wheezing: No  chest pain: No  palpitations: No  blood in stool: No  constipation: Yes  vomiting: No  diarrhea: No  polydipsia: No  polyuria: No  difficulty urinating: No  hematuria: No  menstrual problem: No  dysuria: No  joint swelling: No  arthralgias: No  headaches: No  weakness: Yes  confusion: No  dysphoric mood: No

## 2024-06-03 NOTE — TELEPHONE ENCOUNTER
----- Message from Mylene Orcky sent at 6/3/2024  1:51 PM CDT -----  Contact: Donte [facility]  Caroline Arguello  MRN: [id number]  : 1932  PCP: Ev Guevara  Home Phone      [phone]  Work Phone      [phone]  Mobile          [phone]      MESSAGE:     Donte with [facility] call stating pt needs orders for skill nursing visit , occupational therapy. He also states that she need orders for wound care.she has a wound on her left heal, she has a surgical incision on her left hip. Pt had a visit for today @ 4:15              [phone] Don[facility]

## 2024-06-03 NOTE — PROGRESS NOTES
HPI:  Caroline Arguello is a 89 y.o. female here for No chief complaint on file.  .  Patient Name: Caroline Arguello  MRN: [id number]  Patient Class: IP- Inpatient     Admission Date: 5/24/2024  Length of Stay: 2 days  Attending Physician: Miki Vasques MD  Primary Care Provider: Ev Guevara MD           Subjective:      Principal Problem:Near syncope           HPI:  89 yo F with PMHx of chronic AF on Xarelto, HTN, HLD, depression, dementia, and frequent falls who presented to ED following a syncopal episode at home. Patient is only oriented to self at baseline and requires assistance with all ADLs due to dementia per chart review. On my eval, pt is alone in room. Per ER notes: daughter states that she was sitting there, became lightheaded and fell forward while sitting. Patient did not fall to the ground. She immediately regained consciousness. Denies any chest pain or shortness breath associated with this. No abdominal pain, nausea, vomiting, diarrhea. Patient reports she feels at her baseline. EMS noted a blood pressure of systolic 80, gave 500 mL of fluid prior to arrival. She was found to have UTI in ER.     Overview/Hospital Course:  5/25/24: no acute events overnight. She is much more alert today. Back to her baseline mental status per daughter. Complains of some arthralgias that are chronic. She has no recollection of the events leading to hospitalization. VSS     5/26/24: no acute events overnight. Mental status at baseline. No new complaints. Temp 100.4 yesteraday evening but afebrile sense. Urine culture proteus, awaiting sensitivities. Suspect DC in AM, daughter is not comfortable with DC home today due to fever last night.     5/27/24  She is afebrile   Her BP is stable.  Her cultures for urine are back      PROTEUS MIRABILIS  >100,000 cfu/ml  P        Urine Culture, Routine        Abnormal   GRAM NEGATIVE PABLO  > 100,000 cfu/ml  Identification and susceptibility pending  P      Resulting Agency  [facility]         Susceptibility                   Proteus mirabilis       CULTURE, URINE (Preliminary)       Amox/K Clav'ate <=8/4 mcg/mL Sensitive       Amp/Sulbactam <=8/4 mcg/mL Sensitive       Ampicillin <=8 mcg/mL Sensitive       Cefazolin <=2 mcg/mL Sensitive       Cefepime <=2 mcg/mL Sensitive       Ceftriaxone <=1 mcg/mL Sensitive       Ciprofloxacin <=1 mcg/mL Sensitive       Ertapenem <=0.5 mcg/mL Sensitive       Gentamicin <=4 mcg/mL Sensitive       Levofloxacin <=2 mcg/mL Sensitive       Meropenem <=1 mcg/mL Sensitive       Piperacillin/Tazo <=16 mcg/mL Sensitive       Tobramycin <=4 mcg/mL Sensitive       Trimeth/Sulfa <=2/38 mcg/mL Sensitive              I talked to otis her daughter ;discussed plan .  Will dc home on po antibiotics   She has sitters at home   Will order home health and home PT                  Review of Systems   Unable to perform ROS: Dementia   Constitutional:         Much more alert; pleasantly demented      Objective:      Vital Signs (Most Recent):  Temp: 98.3 °F (36.8 °C) (05/27/24 0736)  Pulse: 90 (05/27/24 0800)  Resp: 16 (05/27/24 0736)  BP: (!) 141/81 (05/27/24 0736)  SpO2: 96 % (05/27/24 0736) Vital Signs (24h Range):  Temp:  [97.6 °F (36.4 °C)-99 °F (37.2 °C)] 98.3 °F (36.8 °C)  Pulse:  [] 90  Resp:  [16-20] 16  SpO2:  [93 %-96 %] 96 %  BP: (122-146)/(68-97) 141/81      Weight: 67.1 kg (147 lb 14.9 oz)  Body mass index is 23.88 kg/m².     Intake/Output Summary (Last 24 hours) at 5/27/2024 0932  Last data filed at 5/27/2024 0820        Gross per 24 hour   Intake 1545.76 ml   Output 2200 ml   Net -654.24 ml         Physical Exam  Vitals and nursing note reviewed.   Constitutional:       General: She is not in acute distress.     Comments: Working with PT    HENT:      Head: Normocephalic and atraumatic.      Mouth/Throat:      Mouth: Mucous membranes are moist.   Eyes:      Extraocular Movements: Extraocular movements intact.      Conjunctiva/sclera: Conjunctivae normal.       Pupils: Pupils are equal, round, and reactive to light.   Cardiovascular:      Rate and Rhythm: Normal rate and regular rhythm.      Heart sounds: Normal heart sounds. No murmur heard.  Pulmonary:      Effort: Pulmonary effort is normal. No respiratory distress.      Breath sounds: Normal breath sounds.   Abdominal:      General: Bowel sounds are normal. There is no distension.      Palpations: Abdomen is soft.      Tenderness: There is no abdominal tenderness.   Musculoskeletal:      Cervical back: Neck supple.      Right lower leg: No edema.      Left lower leg: No edema.   Skin:     General: Skin is warm and dry.   Neurological:      General: No focal deficit present.      Mental Status: Mental status is at baseline. She is disoriented.                Significant Labs: All pertinent labs within the past 24 hours have been reviewed.  CBC:           Recent Labs   Lab 05/26/24  1103 05/27/24  0424   WBC 5.94 5.63   HGB 10.7* 10.7*   HCT 32.8* 32.8*    243      CMP:           Recent Labs   Lab 05/25/24  1207 05/26/24  1104    140   K 3.4* 3.5    106   CO2 22* 24   * 145*   BUN 15 10   CREATININE 0.8 0.8   CALCIUM 9.3 9.3   ANIONGAP 13 10               Assessment/Plan:      * Near syncope  Likely 2/2 orthostatic hypotension vs vasovagal  Tele  Adjusted carvedilol dose to 12.5mg BID (previously on 25mg nightly)              Acute cystitis without hematuria  Rocephin day 3  Urine culture proteus, sensitivities pending  Blood culture NGTD  Mental status much improved     5/27/24  PROTEUS MIRABILIS  >100,000 cfu/ml  P        Urine Culture, Routine        Abnormal   GRAM NEGATIVE PABLO  > 100,000 cfu/ml  Identification and susceptibility pending  P      Resulting Agency [facility]         Susceptibility                   Proteus mirabilis       CULTURE, URINE (Preliminary)       Amox/K Clav'ate <=8/4 mcg/mL Sensitive       Amp/Sulbactam <=8/4 mcg/mL Sensitive       Ampicillin <=8 mcg/mL Sensitive        Cefazolin <=2 mcg/mL Sensitive       Cefepime <=2 mcg/mL Sensitive       Ceftriaxone <=1 mcg/mL Sensitive       Ciprofloxacin <=1 mcg/mL Sensitive       Ertapenem <=0.5 mcg/mL Sensitive       Gentamicin <=4 mcg/mL Sensitive       Levofloxacin <=2 mcg/mL Sensitive       Meropenem <=1 mcg/mL Sensitive       Piperacillin/Tazo <=16 mcg/mL Sensitive       Tobramycin <=4 mcg/mL Sensitive       Trimeth/Sulfa <=2/38 mcg/mL Sensitive                 Home on PO antibiotics     Closed fracture of left hip  Weight bearing as tolerated per chart review  PT/OT        Dementia in other diseases classified elsewhere, unspecified severity, with mood disturbance  Patient with dementia with likely etiology of alzheimer's dementia. Dementia is moderate. The patient does not have signs of behavioral disturbance. Continue non-pharmacologic interventions to prevent delirium (No VS between 11PM-5AM, activity during day, opening blinds, providing glasses/hearing aids, and up in chair during daytime). Will avoid narcotics and benzos unless absolutely necessary. PRN anti-psychotics are not prescribed to avoid self harm behaviors.     Chronic kidney disease, stage 3a  Creatine stable for now. BMP reviewed- noted Estimated Creatinine Clearance: 42.9 mL/min (based on SCr of 0.8 mg/dL). according to latest data. Based on current GFR, CKD stage is stage 3 - GFR 30-59.  Monitor UOP and serial BMP and adjust therapy as needed. Renally dose meds. Avoid nephrotoxic medications and procedures.     Chronic radicular pain of lower back  Cont home gabapentin 600mg nightly        Essential hypertension  Chronic, controlled. Latest blood pressure and vitals reviewed-      Temp:  [97.6 °F (36.4 °C)-99 °F (37.2 °C)]   Pulse:  []   Resp:  [16-20]   BP: (122-146)/(68-97)   SpO2:  [93 %-96 %] .   Home meds for hypertension were reviewed and noted below.   Hypertension Medications                    carvediloL (COREG) 25 MG tablet Take 1 tablet (25 mg  total) by mouth Daily.                While in the hospital, will manage blood pressure as follows; Adjust home antihypertensive regimen as follows- change carvedilol to 12.5mg BID     Will utilize p.r.n. blood pressure medication only if patient's blood pressure greater than 180/110 and she develops symptoms such as worsening chest pain or shortness of breath.     Chronic atrial fibrillation  Patient with Long standing persistent (>12 months) atrial fibrillation which is controlled currently with Beta Blocker. Patient is currently in atrial fibrillation.[id number] Score: 3. Anticoagulation indicated. Anticoagulation done with home xarelto .     Major depressive disorder, recurrent episode, moderate  Patient has recurrent depression which is mild and is currently controlled. Will Continue anti-depressant medications. We will not consult psychiatry at this time. Patient does not display psychosis at this time. Continue to monitor closely and adjust plan of care as needed.     Cont home sertraline 50mg daily     Hyperlipidemia  Cont home statin           VTE Risk Mitigation (From admission, onward)              Ordered       rivaroxaban tablet 15 mg  with dinner         05/27/24 0711       IP VTE HIGH RISK PATIENT  Once         05/24/24 1418       Place sequential compression device  Until discontinued         05/24/24 1418                          Discharge Planning   LETICIA:      Code Status: Full Code   Is the patient medically ready for discharge?:     Reason for patient still in hospital (select all that apply): Pending disposition  Discharge Plan A: Home with family, Home Health                     Ev Guevara MD  Department of Hospital Medicine   [facility] ([facility])    6/3/24  Virtual visit ; 15 minutes     She is getting home miladis and PT .  She is taking her antibiotics   Heel wound discussed .  Scabbed hip area .      The patient location is: home  The chief complaint leading to consultation is:  hospital discharge    Visit type: audiovisual    Face to Face time with patient caregiver : 15   15 minutes of total time spent on the encounter, which includes face to face time and non-face to face time preparing to see the patient (eg, review of tests), Obtaining and/or reviewing separately obtained history, Documenting clinical information in the electronic or other health record, Independently interpreting results (not separately reported) and communicating results to the patient/family/caregiver, or Care coordination (not separately reported).         Each patient to whom he or she provides medical services by telemedicine is:  (1) informed of the relationship between the physician and patient and the respective role of any other health care provider with respect to management of the patient; and (2) notified that he or she may decline to receive medical services by telemedicine and may withdraw from such care at any time.      Assessment/Plan:  1. Hospital discharge follow-up    2. Proteus infection      Finish antibiotics   Bactroban for wound .  Continue home health and home PT      Answers submitted by the patient for this visit:  Review of Systems Questionnaire (Submitted on 6/3/2024)  activity change: Yes  unexpected weight change: Yes  neck pain: No  hearing loss: Yes  rhinorrhea: No  trouble swallowing: No  eye discharge: No  visual disturbance: No  chest tightness: No  wheezing: No  chest pain: No  palpitations: No  blood in stool: No  constipation: Yes  vomiting: No  diarrhea: No  polydipsia: No  polyuria: No  difficulty urinating: No  hematuria: No  menstrual problem: No  dysuria: No  joint swelling: No  arthralgias: No  headaches: No  weakness: Yes  confusion: No  dysphoric mood: No    Answers submitted by the patient for this visit:  Review of Systems Questionnaire (Submitted on 6/3/2024)  activity change: Yes  unexpected weight change: Yes  neck pain: No  hearing loss: Yes  rhinorrhea:  No  trouble swallowing: No  eye discharge: No  visual disturbance: No  chest tightness: No  wheezing: No  chest pain: No  palpitations: No  blood in stool: No  constipation: Yes  vomiting: No  diarrhea: No  polydipsia: No  polyuria: No  difficulty urinating: No  hematuria: No  menstrual problem: No  dysuria: No  joint swelling: No  arthralgias: No  headaches: No  weakness: Yes  confusion: No  dysphoric mood: No

## 2024-06-05 ENCOUNTER — TELEPHONE (OUTPATIENT)
Dept: INTERNAL MEDICINE | Facility: CLINIC | Age: 89
End: 2024-06-05
Payer: MEDICARE

## 2024-06-05 NOTE — TELEPHONE ENCOUNTER
Please advise on this message from [employer]. Thanks       ----- Message from Gricelda Orantes sent at 2024 12:31 PM CDT -----  Contact: Donte/ RODY  Caroline Arguello  MRN: [id number]  : 1932  PCP: Ev Guevara  Home Phone      [phone]  Work Phone      [phone]  Mobile          [phone]      MESSAGE:     Donte with  states pt had a significant BP drop sitting was 63/43 and lying down is 112/73 without carvediloL (COREG) 12.5 MG tablet. They did not give PT the med today. Pt was alert and fine with these pressures, pt and family refused er. Donte would like to know if we would like to change anything or add anything.         Please advise   [phone]

## 2024-06-06 NOTE — TELEPHONE ENCOUNTER
Instructed Chayo to hold coreg until SBP remains above 100. She will keep me and/or home health posted of BP readings

## 2024-06-06 NOTE — TELEPHONE ENCOUNTER
Notified Donte of instructions per Dr. Guevara. Attempted to contact her daughter (Chayo) to notify her, but she was unavailable. Will return call later

## 2024-06-14 ENCOUNTER — DOCUMENT SCAN (OUTPATIENT)
Dept: HOME HEALTH SERVICES | Facility: HOSPITAL | Age: 89
End: 2024-06-14
Payer: MEDICARE

## 2024-06-18 ENCOUNTER — TELEPHONE (OUTPATIENT)
Dept: INTERNAL MEDICINE | Facility: CLINIC | Age: 89
End: 2024-06-18
Payer: MEDICARE

## 2024-06-18 NOTE — TELEPHONE ENCOUNTER
Laura with HH called. Pt's wound is not healing. Verbal orders given: will clean with betadine and leave open to air and the one on her sacrum she will apply calcium alginate and cover with border foam dressing.

## 2024-06-19 RX ORDER — SERTRALINE HYDROCHLORIDE 50 MG/1
TABLET, FILM COATED ORAL
Qty: 90 TABLET | Refills: 3 | Status: SHIPPED | OUTPATIENT
Start: 2024-06-19

## 2024-06-19 RX ORDER — GABAPENTIN 600 MG/1
600 TABLET ORAL NIGHTLY
Qty: 90 TABLET | Refills: 1 | Status: SHIPPED | OUTPATIENT
Start: 2024-06-19 | End: 2025-06-19

## 2024-06-19 RX ORDER — ATORVASTATIN CALCIUM 10 MG/1
TABLET, FILM COATED ORAL NIGHTLY
Qty: 90 TABLET | Refills: 0 | Status: SHIPPED | OUTPATIENT
Start: 2024-06-19

## 2024-06-19 NOTE — TELEPHONE ENCOUNTER
Refill Routing Note   Medication(s) are not appropriate for processing by [facility] for the following reason(s):        Required labs outdated  Outside of protocol    [facility] action(s):  Defer  Route     Requires labs : Yes             Appointments  past 12m or future 3m with PCP    Date Provider   Last Visit   6/3/2024 Ev Guevara MD   Next Visit   6/27/2024 Ev Guevara MD   ED visits in past 90 days: 1        Note composed:9:22 AM 06/19/2024

## 2024-06-19 NOTE — TELEPHONE ENCOUNTER
Care Due:                  Date            Visit Type   Department     Provider  --------------------------------------------------------------------------------                                ESTABLISHED                              PATIENT -    [facility]  Last Visit: 06-      [facility]      MEDICINE II    Ev Guevara  Next Visit: None Scheduled  None         None Found                                                            Last  Test          Frequency    Reason                     Performed    Due Date  --------------------------------------------------------------------------------    Lipid Panel.  12 months..  atorvastatin.............  07- 07-    [facility] Embedded Care Due Messages. Reference number: [id number].   6/19/2024 5:09:06 AM CDT

## 2024-06-24 ENCOUNTER — TELEPHONE (OUTPATIENT)
Dept: INTERNAL MEDICINE | Facility: CLINIC | Age: 89
End: 2024-06-24
Payer: MEDICARE

## 2024-06-24 DIAGNOSIS — I11.0 HYPERTENSIVE HEART DISEASE WITH HEART FAILURE: ICD-10-CM

## 2024-06-24 DIAGNOSIS — M54.16 CHRONIC RADICULAR PAIN OF LOWER BACK: ICD-10-CM

## 2024-06-24 DIAGNOSIS — J44.9 CHRONIC OBSTRUCTIVE PULMONARY DISEASE, UNSPECIFIED COPD TYPE: Primary | ICD-10-CM

## 2024-06-24 DIAGNOSIS — G89.29 CHRONIC RADICULAR PAIN OF LOWER BACK: ICD-10-CM

## 2024-06-24 NOTE — TELEPHONE ENCOUNTER
----- Message from Mylene Hidalgo sent at 2024  2:47 PM CDT -----  Contact: pt daughter  Caroline Arguello  MRN: [id number]  : 1932  PCP: Ev Guevara  Home Phone      [phone]  Work Phone      [phone]  Mobile          [phone]      MESSAGE:     Pt daughter states she needs orders for a hospital bed sent in . Daughter states that Dr. Guevara knows about it already .                [phone]

## 2024-06-24 NOTE — TELEPHONE ENCOUNTER
Order for hospital bed faxed to [employer]; along with clinic note, insurance and demographic information. Patient's daughter notified

## 2024-07-01 ENCOUNTER — DOCUMENT SCAN (OUTPATIENT)
Dept: HOME HEALTH SERVICES | Facility: HOSPITAL | Age: 89
End: 2024-07-01
Payer: MEDICARE

## 2024-07-03 ENCOUNTER — TELEPHONE (OUTPATIENT)
Dept: INTERNAL MEDICINE | Facility: CLINIC | Age: 89
End: 2024-07-03
Payer: MEDICARE

## 2024-07-03 NOTE — TELEPHONE ENCOUNTER
Advised Chayo to contact [facility] [employer] regarding the hospital bed as the may have already sent the order out to a [employer] company.

## 2024-07-03 NOTE — TELEPHONE ENCOUNTER
----- Message from Mary Brown sent at 7/3/2024  9:47 AM CDT -----  Contact: Chayo Arguello  MRN: [id number]  : 1932  PCP: Ev Guevara  Home Phone      [phone]  Work Phone      [phone]  Mobile          [phone]      MESSAGE: Chayo states they received a letter from the insurance stating she was approved for the hospital bed. Please advise as to the next steps to be taken to get the bed. Chayo states if she needs to come in and sign anything, she is right there at the [facility]          [phone]

## 2024-07-05 ENCOUNTER — TELEPHONE (OUTPATIENT)
Dept: INTERNAL MEDICINE | Facility: CLINIC | Age: 89
End: 2024-07-05
Payer: MEDICARE

## 2024-07-05 DIAGNOSIS — I11.0 HYPERTENSIVE HEART DISEASE WITH HEART FAILURE: ICD-10-CM

## 2024-07-05 DIAGNOSIS — J44.9 CHRONIC OBSTRUCTIVE PULMONARY DISEASE, UNSPECIFIED COPD TYPE: Primary | ICD-10-CM

## 2024-07-05 DIAGNOSIS — M54.16 CHRONIC RADICULAR PAIN OF LOWER BACK: ICD-10-CM

## 2024-07-05 DIAGNOSIS — G89.29 CHRONIC RADICULAR PAIN OF LOWER BACK: ICD-10-CM

## 2024-07-05 NOTE — TELEPHONE ENCOUNTER
Verbal order given to continue PT.     Per Donte, pt also needs a dara lift. Order faxed to [facility].

## 2024-07-05 NOTE — TELEPHONE ENCOUNTER
----- Message from Mylene Hidalgo sent at 2024 10:33 AM CDT -----  Contact: calista [facility]  Caroline Arguello  MRN: [id number]  : 1932  PCP: Ev Guevara  Home Phone      [phone]  Work Phone      [phone]  Mobile          [phone]      MESSAGE:     Calista with [facility] states pt need orders to continue physical therapy               [phone]

## 2024-07-08 ENCOUNTER — DOCUMENT SCAN (OUTPATIENT)
Dept: HOME HEALTH SERVICES | Facility: HOSPITAL | Age: 89
End: 2024-07-08
Payer: MEDICARE

## 2024-07-09 ENCOUNTER — TELEPHONE (OUTPATIENT)
Dept: INTERNAL MEDICINE | Facility: CLINIC | Age: 89
End: 2024-07-09
Payer: MEDICARE

## 2024-07-09 NOTE — TELEPHONE ENCOUNTER
----- Message from Margi James MA sent at 2024  2:59 PM CDT -----  Contact: [facility]    ----- Message -----  From: Mary Brown  Sent: 2024   2:36 PM CDT  To: Ismael RAMOS Staff    Caroline Arguello  MRN: [id number]  : 1932  PCP: Ev Guevara.  Home Phone      [phone]  Work Phone      [phone]  Mobile          [phone]      MESSAGE: [facility] called and left a message on the machine stating [facility] [employer] is still waiting on the written order for the hospital bed for the pt. She stated the PA [id number] was done.       Please let Chayo the daughter know what is going on

## 2024-07-15 ENCOUNTER — TELEPHONE (OUTPATIENT)
Dept: INTERNAL MEDICINE | Facility: CLINIC | Age: 89
End: 2024-07-15
Payer: MEDICARE

## 2024-07-15 NOTE — TELEPHONE ENCOUNTER
----- Message from Mary Brown sent at 7/15/2024  8:46 AM CDT -----  Contact: Chayo Arguello  MRN: [id number]  : 1932  PCP: Ev Guevara  Home Phone      [phone]  Work Phone      [phone]  Mobile          [phone]      MESSAGE: Chayo called to follow up on the hospital bed. She states no one has contacted her in regards to it. Please advise         [phone]

## 2024-07-15 NOTE — TELEPHONE ENCOUNTER
Chayo has been notified [employer] stated if the insurance process today it will be delivered tomorrow.

## 2024-07-15 NOTE — TELEPHONE ENCOUNTER
"Fax received from [employer] requesting that you add the following statement verbatim to clinic note dated 6/3/24. This is an insurance requirement for dara lift. Thanks   "Patient needs a lift in order to transfer between a bed and chair or bed and commode. Patient will be bed confined without the lift"    "

## 2024-07-19 ENCOUNTER — EXTERNAL HOME HEALTH (OUTPATIENT)
Dept: HOME HEALTH SERVICES | Facility: HOSPITAL | Age: 89
End: 2024-07-19
Payer: MEDICARE

## 2024-07-28 NOTE — TELEPHONE ENCOUNTER
Refill Routing Note   Medication(s) are not appropriate for processing by [facility] for the following reason(s):        New or recently adjusted medication    [facility] action(s):  Defer               Appointments  past 12m or future 3m with PCP    Date Provider   Last Visit   6/3/2024 Ev Guevara MD   Next Visit   Visit date not found Ev Guevara MD   ED visits in past 90 days: 0        Note composed:5:10 AM 07/28/2024

## 2024-07-28 NOTE — TELEPHONE ENCOUNTER
No care due was identified.  [facility] Embedded Care Due Messages. Reference number: [id number].   7/28/2024 4:55:11 AM CDT

## 2024-07-29 ENCOUNTER — TELEPHONE (OUTPATIENT)
Dept: INTERNAL MEDICINE | Facility: CLINIC | Age: 89
End: 2024-07-29
Payer: MEDICARE

## 2024-07-29 PROBLEM — J96.01 ACUTE HYPOXEMIC RESPIRATORY FAILURE: Status: RESOLVED | Noted: 2024-04-25 | Resolved: 2024-07-29

## 2024-07-29 RX ORDER — CARVEDILOL 12.5 MG/1
12.5 TABLET ORAL 2 TIMES DAILY WITH MEALS
Qty: 180 TABLET | Refills: 0 | Status: SHIPPED | OUTPATIENT
Start: 2024-07-29 | End: 2025-07-29

## 2024-07-29 NOTE — TELEPHONE ENCOUNTER
----- Message from Gricelda Orantes sent at 2024 12:56 PM CDT -----  Contact: Donte/ RODY  Caroline Arguello  MRN: [id number]  : 1932  PCP: Ev Guevara  Home Phone      [phone]  Work Phone      [phone]  Mobile          [phone]      MESSAGE:     Donte with HH states they need Orders for PT in order to train family for dara lift.         Please advise   [phone]

## 2024-07-30 PROCEDURE — G0179 MD RECERTIFICATION HHA PT: HCPCS | Mod: ,,, | Performed by: INTERNAL MEDICINE

## 2024-08-14 ENCOUNTER — DOCUMENT SCAN (OUTPATIENT)
Dept: HOME HEALTH SERVICES | Facility: HOSPITAL | Age: 89
End: 2024-08-14
Payer: MEDICARE

## 2024-08-21 ENCOUNTER — DOCUMENT SCAN (OUTPATIENT)
Dept: HOME HEALTH SERVICES | Facility: HOSPITAL | Age: 89
End: 2024-08-21
Payer: MEDICARE

## 2024-08-24 ENCOUNTER — EXTERNAL HOME HEALTH (OUTPATIENT)
Dept: HOME HEALTH SERVICES | Facility: HOSPITAL | Age: 89
End: 2024-08-24
Payer: MEDICARE

## 2024-08-28 ENCOUNTER — DOCUMENT SCAN (OUTPATIENT)
Dept: HOME HEALTH SERVICES | Facility: HOSPITAL | Age: 89
End: 2024-08-28
Payer: MEDICARE

## 2024-09-04 ENCOUNTER — TELEPHONE (OUTPATIENT)
Dept: INTERNAL MEDICINE | Facility: CLINIC | Age: 89
End: 2024-09-04
Payer: MEDICARE

## 2024-09-04 NOTE — TELEPHONE ENCOUNTER
----- Message from Gricelda Orantes sent at 2024 12:17 PM CDT -----  Contact: Chayo/daughter  Caroline Arguello  MRN: [id number]  : 1932  PCP: Ev Guevara  Home Phone      [phone]  Work Phone      [phone]  Mobile          [phone]      MESSAGE:     Chayo pts daughter states wound care suggests liquid tylenol with codeine and would like to make sure this is okay.         Please advise   [phone]

## 2024-09-04 NOTE — TELEPHONE ENCOUNTER
Spoke to patient's daughter, Chayo, notified, will try regular tylenol for now. All questions answered.  Reports that she will keep provider posted.

## 2024-09-06 ENCOUNTER — LAB VISIT (OUTPATIENT)
Dept: LAB | Facility: HOSPITAL | Age: 89
End: 2024-09-06
Payer: MEDICARE

## 2024-09-06 ENCOUNTER — TELEPHONE (OUTPATIENT)
Dept: FAMILY MEDICINE | Facility: CLINIC | Age: 89
End: 2024-09-06
Payer: MEDICARE

## 2024-09-06 DIAGNOSIS — I13.0 HYPERTENSIVE HEART AND RENAL DISEASE WITH CONGESTIVE HEART FAILURE: Primary | ICD-10-CM

## 2024-09-06 DIAGNOSIS — E44.0 MALNUTRITION OF MODERATE DEGREE: ICD-10-CM

## 2024-09-06 DIAGNOSIS — E87.6 HYPOKALEMIA: Primary | ICD-10-CM

## 2024-09-06 LAB
ALBUMIN SERPL BCP-MCNC: 2.4 G/DL (ref 3.5–5.2)
ALP SERPL-CCNC: 73 U/L (ref 55–135)
ALT SERPL W/O P-5'-P-CCNC: 11 U/L (ref 10–44)
ANION GAP SERPL CALC-SCNC: 15 MMOL/L (ref 8–16)
AST SERPL-CCNC: 17 U/L (ref 10–40)
BASOPHILS # BLD AUTO: 0.04 K/UL (ref 0–0.2)
BASOPHILS NFR BLD: 0.5 % (ref 0–1.9)
BILIRUB SERPL-MCNC: 0.7 MG/DL (ref 0.1–1)
BUN SERPL-MCNC: 6 MG/DL (ref 10–30)
CALCIUM SERPL-MCNC: 8.8 MG/DL (ref 8.7–10.5)
CHLORIDE SERPL-SCNC: 95 MMOL/L (ref 95–110)
CO2 SERPL-SCNC: 26 MMOL/L (ref 23–29)
CREAT SERPL-MCNC: 0.5 MG/DL (ref 0.5–1.4)
CRP SERPL-MCNC: 90.3 MG/L (ref 0–8.2)
DIFFERENTIAL METHOD BLD: ABNORMAL
EOSINOPHIL # BLD AUTO: 0 K/UL (ref 0–0.5)
EOSINOPHIL NFR BLD: 0.3 % (ref 0–8)
ERYTHROCYTE [DISTWIDTH] IN BLOOD BY AUTOMATED COUNT: 18.6 % (ref 11.5–14.5)
ERYTHROCYTE [SEDIMENTATION RATE] IN BLOOD BY WESTERGREN METHOD: 46 MM/HR (ref 0–20)
EST. GFR  (NO RACE VARIABLE): >60 ML/MIN/1.73 M^2
GLUCOSE SERPL-MCNC: 79 MG/DL (ref 70–110)
HCT VFR BLD AUTO: 36.1 % (ref 37–48.5)
HGB BLD-MCNC: 12.1 G/DL (ref 12–16)
IMM GRANULOCYTES # BLD AUTO: 0.05 K/UL (ref 0–0.04)
IMM GRANULOCYTES NFR BLD AUTO: 0.7 % (ref 0–0.5)
LYMPHOCYTES # BLD AUTO: 1.7 K/UL (ref 1–4.8)
LYMPHOCYTES NFR BLD: 23.2 % (ref 18–48)
MCH RBC QN AUTO: 27.5 PG (ref 27–31)
MCHC RBC AUTO-ENTMCNC: 33.5 G/DL (ref 32–36)
MCV RBC AUTO: 82 FL (ref 82–98)
MONOCYTES # BLD AUTO: 0.4 K/UL (ref 0.3–1)
MONOCYTES NFR BLD: 5 % (ref 4–15)
NEUTROPHILS # BLD AUTO: 5.2 K/UL (ref 1.8–7.7)
NEUTROPHILS NFR BLD: 70.3 % (ref 38–73)
NRBC BLD-RTO: 0 /100 WBC
PLATELET # BLD AUTO: 379 K/UL (ref 150–450)
PMV BLD AUTO: 8.5 FL (ref 9.2–12.9)
POTASSIUM SERPL-SCNC: 2.5 MMOL/L (ref 3.5–5.1)
PROT SERPL-MCNC: 6.1 G/DL (ref 6–8.4)
RBC # BLD AUTO: 4.4 M/UL (ref 4–5.4)
SODIUM SERPL-SCNC: 136 MMOL/L (ref 136–145)
WBC # BLD AUTO: 7.42 K/UL (ref 3.9–12.7)

## 2024-09-06 PROCEDURE — 85025 COMPLETE CBC W/AUTO DIFF WBC: CPT

## 2024-09-06 PROCEDURE — 86140 C-REACTIVE PROTEIN: CPT

## 2024-09-06 PROCEDURE — 80053 COMPREHEN METABOLIC PANEL: CPT

## 2024-09-06 PROCEDURE — 85651 RBC SED RATE NONAUTOMATED: CPT

## 2024-09-06 RX ORDER — POTASSIUM CHLORIDE 750 MG/1
10 CAPSULE, EXTENDED RELEASE ORAL 2 TIMES DAILY
Qty: 60 CAPSULE | Refills: 0 | Status: SHIPPED | OUTPATIENT
Start: 2024-09-06

## 2024-09-06 NOTE — TELEPHONE ENCOUNTER
Diagnoses and all orders for this visit:    Hypokalemia  -     potassium chloride (MICRO-K) 10 MEQ CpSR; Take 1 capsule (10 mEq total) by mouth 2 (two) times daily.  -     Basic Metabolic Panel; Future      Repeat lab in one week

## 2024-09-06 NOTE — TELEPHONE ENCOUNTER
[facility] called w/ critical value potassium 2.5.    Pt of Dr Guevara, labs ordered by wound care NP. Home Health would not be able to go out for a redraw today. Please advise.

## 2024-09-13 ENCOUNTER — TELEPHONE (OUTPATIENT)
Dept: ADMINISTRATIVE | Facility: CLINIC | Age: 89
End: 2024-09-13
Payer: MEDICARE

## 2024-09-13 ENCOUNTER — PATIENT MESSAGE (OUTPATIENT)
Dept: ADMINISTRATIVE | Facility: CLINIC | Age: 89
End: 2024-09-13
Payer: MEDICARE

## 2024-09-13 ENCOUNTER — LAB VISIT (OUTPATIENT)
Dept: LAB | Facility: HOSPITAL | Age: 89
End: 2024-09-13
Attending: FAMILY MEDICINE
Payer: MEDICARE

## 2024-09-13 DIAGNOSIS — N18.9 CKD (CHRONIC KIDNEY DISEASE): Primary | ICD-10-CM

## 2024-09-13 LAB
ANION GAP SERPL CALC-SCNC: 12 MMOL/L (ref 8–16)
BUN SERPL-MCNC: 9 MG/DL (ref 10–30)
CALCIUM SERPL-MCNC: 9 MG/DL (ref 8.7–10.5)
CHLORIDE SERPL-SCNC: 100 MMOL/L (ref 95–110)
CO2 SERPL-SCNC: 27 MMOL/L (ref 23–29)
CREAT SERPL-MCNC: 0.5 MG/DL (ref 0.5–1.4)
EST. GFR  (NO RACE VARIABLE): >60 ML/MIN/1.73 M^2
GLUCOSE SERPL-MCNC: 89 MG/DL (ref 70–110)
POTASSIUM SERPL-SCNC: 3.3 MMOL/L (ref 3.5–5.1)
SODIUM SERPL-SCNC: 139 MMOL/L (ref 136–145)

## 2024-09-13 PROCEDURE — 80048 BASIC METABOLIC PNL TOTAL CA: CPT | Performed by: FAMILY MEDICINE

## 2024-09-14 ENCOUNTER — TELEPHONE (OUTPATIENT)
Dept: ADMINISTRATIVE | Facility: CLINIC | Age: 89
End: 2024-09-14
Payer: MEDICARE
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
## 2024-09-26 ENCOUNTER — TELEPHONE (OUTPATIENT)
Dept: INTERNAL MEDICINE | Facility: CLINIC | Age: 89
End: 2024-09-26
Payer: MEDICARE

## 2024-09-26 DIAGNOSIS — R30.0 DYSURIA: Primary | ICD-10-CM

## 2024-09-26 NOTE — TELEPHONE ENCOUNTER
----- Message from Mylene Hidalgo sent at 2024  9:08 AM CDT -----  Contact: pt daughter  Caroline Arguello  MRN: [id number]  : 1932  PCP: Ev Guevara  Home Phone      [phone]  Work Phone      [phone]  Mobile          [phone]      MESSAGE:     Pt daughter Chayo would like a call back about her mother.          [phone]

## 2024-09-26 NOTE — TELEPHONE ENCOUNTER
"Spoke at length to Chayo regarding Ms. Velazquez's declining condition. She states that she is completely bedridden, not eating/drinking much, having increased agitation, possible UTI (she reports that she is "touching her bottom"). She is requesting orders for U/A to be sent to home health. I ordered U/A w/ reflex to [facility] via I/O cath. Order faxed to [facility]. I called [facility] and verified that a nurse would be seeing her tomorrow. She also wanted to let you know that she has been giving Ms. Velazquez diazepam 5 mg daily prn agitation (this is Chayo's medication). She is requesting that you send in a script for Ms. Velazquez as this really works to relax her. She is aware that Dr. Guevara will not respond to this message until next week when he returns to clinic.   [facility]  "

## 2024-09-27 ENCOUNTER — LAB VISIT (OUTPATIENT)
Dept: LAB | Facility: HOSPITAL | Age: 89
End: 2024-09-27
Attending: INTERNAL MEDICINE
Payer: MEDICARE

## 2024-09-27 DIAGNOSIS — R30.0 DYSURIA: Primary | ICD-10-CM

## 2024-09-27 LAB
BACTERIA #/AREA URNS HPF: ABNORMAL /HPF
BILIRUB UR QL STRIP: ABNORMAL
CAOX CRY URNS QL MICRO: ABNORMAL
CLARITY UR: CLEAR
COLOR UR: YELLOW
GLUCOSE UR QL STRIP: NEGATIVE
HGB UR QL STRIP: ABNORMAL
HYALINE CASTS #/AREA URNS LPF: 4 /LPF
KETONES UR QL STRIP: ABNORMAL
LEUKOCYTE ESTERASE UR QL STRIP: ABNORMAL
MICROSCOPIC COMMENT: ABNORMAL
NITRITE UR QL STRIP: NEGATIVE
PH UR STRIP: 6 [PH] (ref 5–8)
PROT UR QL STRIP: ABNORMAL
RBC #/AREA URNS HPF: 4 /HPF (ref 0–4)
SP GR UR STRIP: 1.01 (ref 1–1.03)
SQUAMOUS #/AREA URNS HPF: 4 /HPF
URN SPEC COLLECT METH UR: ABNORMAL
UROBILINOGEN UR STRIP-ACNC: 1 EU/DL
WBC #/AREA URNS HPF: 18 /HPF (ref 0–5)

## 2024-09-27 PROCEDURE — 87086 URINE CULTURE/COLONY COUNT: CPT | Performed by: INTERNAL MEDICINE

## 2024-09-27 PROCEDURE — 81000 URINALYSIS NONAUTO W/SCOPE: CPT | Performed by: INTERNAL MEDICINE

## 2024-09-28 DIAGNOSIS — E87.6 HYPOKALEMIA: ICD-10-CM

## 2024-09-28 PROCEDURE — G0179 MD RECERTIFICATION HHA PT: HCPCS | Mod: ,,, | Performed by: INTERNAL MEDICINE

## 2024-09-28 NOTE — TELEPHONE ENCOUNTER
Care Due:                  Date            Visit Type   Department     Provider  --------------------------------------------------------------------------------                                ESTABLISHED                              PATIENT -    [facility]  Last Visit: 06-      [facility]      MEDICINE II    Ev Guevara  Next Visit: None Scheduled  None         None Found                                                            Last  Test          Frequency    Reason                     Performed    Due Date  --------------------------------------------------------------------------------    Lipid Panel.  12 months..  atorvastatin.............  07- 07-    [facility] Embedded Care Due Messages. Reference number: [id number].   9/28/2024 10:51:17 AM CDT

## 2024-09-29 LAB — BACTERIA UR CULT: NO GROWTH

## 2024-09-29 NOTE — TELEPHONE ENCOUNTER
Refill Routing Note   Medication(s) are not appropriate for processing by [facility] for the following reason(s):        New or recently adjusted medication  No active prescription written by provider    [facility] action(s):  Defer   Requires labs : Yes             Appointments  past 12m or future 3m with PCP    Date Provider   Last Visit   6/3/2024 Ev Guevara MD   Next Visit   Visit date not found Ev Guevara MD   ED visits in past 90 days: 0        Note composed:11:18 PM 09/28/2024

## 2024-09-30 RX ORDER — NITROFURANTOIN MACROCRYSTALS 50 MG/1
50 CAPSULE ORAL DAILY
Qty: 10 CAPSULE | Refills: 0 | Status: SHIPPED | OUTPATIENT
Start: 2024-09-30 | End: 2024-10-10

## 2024-09-30 RX ORDER — POTASSIUM CHLORIDE 750 MG/1
10 CAPSULE, EXTENDED RELEASE ORAL 2 TIMES DAILY
Qty: 60 CAPSULE | Refills: 0 | Status: SHIPPED | OUTPATIENT
Start: 2024-09-30

## 2024-09-30 RX ORDER — DIAZEPAM 5 MG/1
5 TABLET ORAL DAILY PRN
Qty: 30 TABLET | Refills: 0 | Status: SHIPPED | OUTPATIENT
Start: 2024-09-30 | End: 2024-10-30

## 2024-09-30 NOTE — TELEPHONE ENCOUNTER
Patient has UTI  Started on antibiotics.  Drink plenty of water.  If high fevers and chills call.  Diazepam ordered

## 2024-09-30 NOTE — TELEPHONE ENCOUNTER
Called to advise pt's daughter, Chayo, of Dr. Guevara's notes.  She was currently at work and would return call once available.

## 2024-10-01 ENCOUNTER — DOCUMENT SCAN (OUTPATIENT)
Dept: HOME HEALTH SERVICES | Facility: HOSPITAL | Age: 89
End: 2024-10-01
Payer: MEDICARE

## 2024-10-14 ENCOUNTER — LAB VISIT (OUTPATIENT)
Dept: LAB | Facility: HOSPITAL | Age: 89
End: 2024-10-14
Payer: MEDICARE

## 2024-10-14 DIAGNOSIS — L89.150 PRESSURE ULCER OF SACRAL REGION, UNSTAGEABLE: Primary | ICD-10-CM

## 2024-10-14 DIAGNOSIS — E44.0 MODERATE PROTEIN-CALORIE MALNUTRITION: ICD-10-CM

## 2024-10-14 DIAGNOSIS — L89.150 PRESSURE ULCER OF SACRAL REGION, UNSTAGEABLE: ICD-10-CM

## 2024-10-14 LAB
ANION GAP SERPL CALC-SCNC: 15 MMOL/L (ref 8–16)
BUN SERPL-MCNC: 14 MG/DL (ref 10–30)
CALCIUM SERPL-MCNC: 9.9 MG/DL (ref 8.7–10.5)
CHLORIDE SERPL-SCNC: 98 MMOL/L (ref 95–110)
CO2 SERPL-SCNC: 26 MMOL/L (ref 23–29)
CREAT SERPL-MCNC: 0.6 MG/DL (ref 0.5–1.4)
CRP SERPL-MCNC: 22.5 MG/L (ref 0–8.2)
EST. GFR  (NO RACE VARIABLE): >60 ML/MIN/1.73 M^2
GLUCOSE SERPL-MCNC: 74 MG/DL (ref 70–110)
POTASSIUM SERPL-SCNC: 2.8 MMOL/L (ref 3.5–5.1)
SODIUM SERPL-SCNC: 139 MMOL/L (ref 136–145)

## 2024-10-14 PROCEDURE — 86140 C-REACTIVE PROTEIN: CPT

## 2024-10-14 PROCEDURE — 36415 COLL VENOUS BLD VENIPUNCTURE: CPT

## 2024-10-14 PROCEDURE — 80048 BASIC METABOLIC PNL TOTAL CA: CPT

## 2024-10-18 ENCOUNTER — EXTERNAL HOME HEALTH (OUTPATIENT)
Dept: HOME HEALTH SERVICES | Facility: HOSPITAL | Age: 89
End: 2024-10-18
Payer: MEDICARE

## 2024-10-22 ENCOUNTER — DOCUMENT SCAN (OUTPATIENT)
Dept: HOME HEALTH SERVICES | Facility: HOSPITAL | Age: 89
End: 2024-10-22
Payer: MEDICARE

## 2024-10-28 RX ORDER — ATORVASTATIN CALCIUM 10 MG/1
TABLET, FILM COATED ORAL NIGHTLY
Qty: 90 TABLET | Refills: 0 | Status: SHIPPED | OUTPATIENT
Start: 2024-10-28

## 2024-11-12 ENCOUNTER — TELEPHONE (OUTPATIENT)
Dept: INTERNAL MEDICINE | Facility: CLINIC | Age: 89
End: 2024-11-12
Payer: MEDICARE

## 2024-11-12 NOTE — TELEPHONE ENCOUNTER
According to home health nurse, Ms. Velazquez is declining and hospice will be doing an evaluation. Please advise on hospice referral if you feel that it is appropriate. Thanks

## 2024-11-12 NOTE — TELEPHONE ENCOUNTER
----- Message from Mary sent at 2024  8:59 AM CST -----  Contact: Samira with   Caroline Arguello  MRN: [id number]  : 1932  PCP: Ev Guevara  Home Phone      [phone]  Work Phone      [phone]  Mobile          [phone]      MESSAGE:     Samira with [facility]  states she had a wound care visit with the pt yesterday. Pt had a low oxygen saturation of 74%. All other vitals were normal. The daughter is out of town until tomorrow but they will have an informational visit for hospice. Pt is declining not interested in much nutrition, finger tips were cold and had to get a pulse ox reading probably time to transition to Hospice. Samira states they don't need the orders just yet but wanted us to know what was going on incase we wanted to start getting the orders together.       Samira [phone]